# Patient Record
Sex: FEMALE | Race: WHITE | Employment: OTHER | URBAN - METROPOLITAN AREA
[De-identification: names, ages, dates, MRNs, and addresses within clinical notes are randomized per-mention and may not be internally consistent; named-entity substitution may affect disease eponyms.]

---

## 2018-04-02 ENCOUNTER — ANESTHESIA EVENT (OUTPATIENT)
Dept: CARDIOLOGY | Facility: HOSPITAL | Age: 73
End: 2018-04-02
Payer: MEDICARE

## 2018-04-02 ENCOUNTER — HOSPITAL ENCOUNTER (OUTPATIENT)
Dept: CARDIOLOGY | Facility: HOSPITAL | Age: 73
Discharge: HOME | End: 2018-04-02
Attending: INTERNAL MEDICINE | Admitting: INTERNAL MEDICINE
Payer: MEDICARE

## 2018-04-02 ENCOUNTER — DOCUMENTATION (OUTPATIENT)
Dept: CARDIOLOGY | Facility: HOSPITAL | Age: 73
End: 2018-04-02

## 2018-04-02 ENCOUNTER — ANESTHESIA (OUTPATIENT)
Dept: CARDIOLOGY | Facility: HOSPITAL | Age: 73
End: 2018-04-02
Payer: MEDICARE

## 2018-04-02 VITALS
BODY MASS INDEX: 29.99 KG/M2 | RESPIRATION RATE: 21 BRPM | HEART RATE: 66 BPM | HEIGHT: 65 IN | DIASTOLIC BLOOD PRESSURE: 72 MMHG | OXYGEN SATURATION: 93 % | WEIGHT: 180 LBS | SYSTOLIC BLOOD PRESSURE: 143 MMHG

## 2018-04-02 DIAGNOSIS — I48.91 A-FIB (CMS/HCC): ICD-10-CM

## 2018-04-02 PROBLEM — I63.9 CVA (CEREBRAL VASCULAR ACCIDENT) (CMS/HCC): Status: ACTIVE | Noted: 2018-04-02

## 2018-04-02 PROBLEM — Z95.2 S/P AVR (AORTIC VALVE REPLACEMENT): Status: ACTIVE | Noted: 2018-04-02

## 2018-04-02 PROCEDURE — 37000002 HC ANESTHESIA MAC

## 2018-04-02 PROCEDURE — 25800000 HC PHARMACY IV SOLUTIONS: Performed by: ANESTHESIOLOGY

## 2018-04-02 PROCEDURE — 71000011 HC PACU PHASE 1 EA ADDL MIN

## 2018-04-02 PROCEDURE — 25000000 HC PHARMACY GENERAL: Performed by: ANESTHESIOLOGY

## 2018-04-02 PROCEDURE — 71000001 HC PACU PHASE 1 INITIAL 30MIN

## 2018-04-02 PROCEDURE — 63600000 HC DRUGS/DETAIL CODE: Performed by: ANESTHESIOLOGY

## 2018-04-02 PROCEDURE — B246ZZ4 ULTRASONOGRAPHY OF RIGHT AND LEFT HEART, TRANSESOPHAGEAL: ICD-10-PCS | Performed by: INTERNAL MEDICINE

## 2018-04-02 PROCEDURE — 93312 ECHO TRANSESOPHAGEAL: CPT

## 2018-04-02 RX ORDER — LABETALOL HCL 20 MG/4 ML
SYRINGE (ML) INTRAVENOUS AS NEEDED
Status: DISCONTINUED | OUTPATIENT
Start: 2018-04-02 | End: 2018-04-02 | Stop reason: SURG

## 2018-04-02 RX ORDER — CLOPIDOGREL BISULFATE 75 MG/1
75 TABLET ORAL DAILY
COMMUNITY

## 2018-04-02 RX ORDER — LORAZEPAM 1 MG/1
1 TABLET ORAL 2 TIMES DAILY
Status: ON HOLD | COMMUNITY
End: 2021-05-10 | Stop reason: SDUPTHER

## 2018-04-02 RX ORDER — LANOLIN ALCOHOL/MO/W.PET/CERES
1000 CREAM (GRAM) TOPICAL DAILY
Status: ON HOLD | COMMUNITY
End: 2021-05-10 | Stop reason: ALTCHOICE

## 2018-04-02 RX ORDER — OXYCODONE HYDROCHLORIDE 30 MG/1
30 TABLET ORAL EVERY 8 HOURS PRN
Status: ON HOLD | COMMUNITY
End: 2021-05-10 | Stop reason: ALTCHOICE

## 2018-04-02 RX ORDER — GABAPENTIN 300 MG/1
300 CAPSULE ORAL 2 TIMES DAILY
COMMUNITY

## 2018-04-02 RX ORDER — ASPIRIN 81 MG/1
81 TABLET ORAL DAILY
COMMUNITY

## 2018-04-02 RX ORDER — PROPOFOL 200MG/20ML
SYRINGE (ML) INTRAVENOUS AS NEEDED
Status: DISCONTINUED | OUTPATIENT
Start: 2018-04-02 | End: 2018-04-02 | Stop reason: SURG

## 2018-04-02 RX ORDER — LOPERAMIDE HYDROCHLORIDE 2 MG/1
2 CAPSULE ORAL AS NEEDED
Status: ON HOLD | COMMUNITY
End: 2021-05-10 | Stop reason: ALTCHOICE

## 2018-04-02 RX ORDER — OMEPRAZOLE 20 MG/1
20 CAPSULE, DELAYED RELEASE ORAL
Status: ON HOLD | COMMUNITY
End: 2021-05-10 | Stop reason: ALTCHOICE

## 2018-04-02 RX ORDER — GUAIFENESIN 600 MG/1
600 TABLET, EXTENDED RELEASE ORAL DAILY
Status: ON HOLD | COMMUNITY
End: 2021-05-10 | Stop reason: ALTCHOICE

## 2018-04-02 RX ORDER — DICLOFENAC SODIUM 75 MG/1
75 TABLET, DELAYED RELEASE ORAL DAILY
Status: ON HOLD | COMMUNITY
End: 2021-05-10 | Stop reason: ALTCHOICE

## 2018-04-02 RX ORDER — CETIRIZINE HYDROCHLORIDE 10 MG/1
10 TABLET ORAL DAILY
COMMUNITY

## 2018-04-02 RX ORDER — ASPIRIN 325 MG
1000 TABLET, DELAYED RELEASE (ENTERIC COATED) ORAL DAILY
COMMUNITY

## 2018-04-02 RX ORDER — LOSARTAN POTASSIUM 50 MG/1
50 TABLET ORAL DAILY
Status: ON HOLD | COMMUNITY
End: 2021-05-10 | Stop reason: ALTCHOICE

## 2018-04-02 RX ORDER — AMLODIPINE BESYLATE 5 MG/1
5 TABLET ORAL DAILY
COMMUNITY

## 2018-04-02 RX ADMIN — LABETALOL HYDROCHLORIDE 5 MG: 5 INJECTION, SOLUTION INTRAVENOUS at 08:41

## 2018-04-02 RX ADMIN — SODIUM CHLORIDE 100 ML: 9 INJECTION, SOLUTION INTRAVENOUS at 08:49

## 2018-04-02 RX ADMIN — PROPOFOL 200 MG: 10 INJECTION, EMULSION INTRAVENOUS at 08:45

## 2018-04-02 NOTE — Clinical Note
PAULO probe inserted with jaw lift maneuver. Probe inserted without difficulty. Probe insertion attempts: 1. PAULO in progress.

## 2018-04-02 NOTE — ANESTHESIA PREPROCEDURE EVALUATION
Anesthesia ROS/MED HX      Neuro/Psych    CVA (L hemiparesis)  Cardiovascular   ECG reviewed  Abnormal ECG  Musculoskeletal   Osteoarthritis (spinal stenosis, spinal fusion)  ROS/MED HX Comments:    Neurology/Psychology: fibromyalgia   Cardiology: Hx AVR 13 yrs ago   GI/Hepatic/Renal: IBS      Relevant Problems   No active problems are marked relevant to this note.       Physical Exam    Airway   Mallampati: II  Cardiovascular    Rhythm: regular   Rate: normal  Pulmonary - normal   clear to auscultation  Other Findings   Mult caps        Anesthesia Plan    Plan: MAC   ASA 3

## 2018-04-02 NOTE — DISCHARGE INSTRUCTIONS
No driving x 24 hours.    Avoid hot (temperature) and spicy foods for 48 hours.    Continue your usual home medications.

## 2018-04-02 NOTE — POST-PROCEDURE NOTE
IV removed dressing applied. Post discharge instructions explained to pt. & son. Copy given to son.

## 2018-04-02 NOTE — ANESTHESIA POSTPROCEDURE EVALUATION
Patient: Tracy Fernandez    Procedure Summary     Date:  04/02/18 Room / Location:  [facility] Cardiology; [facility] Cardiac Cath/Electrophysiology Holding    Anesthesia Start:  0828 Anesthesia Stop:  0851    Procedure:  TRANSESOPHAGEAL ECHO (PAULO) Diagnosis:  A-fib (CMS/HCC) (HCC)    Scheduled Providers:   Responsible Provider:  Ibrahima Nguyen DO    Anesthesia Type:  MAC ASA Status:  3          Anesthesia Type: MAC  PACU Vitals     No data found.            Anesthesia Post Evaluation    Pain management: adequate  Patient location during evaluation: PACU  Patient participation: complete - patient participated  Level of consciousness: awake and alert  Cardiovascular status: acceptable  Respiratory status: acceptable  Hydration status: acceptable  Anesthetic complications: no

## 2018-04-03 LAB — BSA FOR ECHO PROCEDURE: 1.94 M2

## 2021-05-05 ENCOUNTER — ANESTHESIA EVENT (INPATIENT)
Dept: OPERATING ROOM | Facility: HOSPITAL | Age: 76
DRG: 493 | End: 2021-05-05
Payer: MEDICARE

## 2021-05-05 ENCOUNTER — APPOINTMENT (INPATIENT)
Dept: RADIOLOGY | Facility: HOSPITAL | Age: 76
DRG: 493 | End: 2021-05-05
Payer: MEDICARE

## 2021-05-05 ENCOUNTER — HOSPITAL ENCOUNTER (INPATIENT)
Facility: HOSPITAL | Age: 76
LOS: 5 days | Discharge: NURSING, REHAB OR LONG-TERM CARE | DRG: 493 | End: 2021-05-10
Attending: ORTHOPAEDIC SURGERY | Admitting: ORTHOPAEDIC SURGERY
Payer: MEDICARE

## 2021-05-05 DIAGNOSIS — Z95.2 S/P AVR (AORTIC VALVE REPLACEMENT): Primary | ICD-10-CM

## 2021-05-05 DIAGNOSIS — T84.59XA: ICD-10-CM

## 2021-05-05 DIAGNOSIS — Z96.621: ICD-10-CM

## 2021-05-05 PROBLEM — M06.9 RHEUMATOID ARTHRITIS (CMS/HCC): Status: ACTIVE | Noted: 2021-05-05

## 2021-05-05 PROBLEM — K21.9 GASTROESOPHAGEAL REFLUX DISEASE WITHOUT ESOPHAGITIS: Status: ACTIVE | Noted: 2021-05-05

## 2021-05-05 PROBLEM — I10 ESSENTIAL HYPERTENSION: Status: ACTIVE | Noted: 2021-05-05

## 2021-05-05 LAB
APPEARANCE SNV: ABNORMAL
BODY FLD TYPE: ABNORMAL
COLOR SNV: ABNORMAL
MONOS+MACROS NFR FLD MANUAL: 1 %
NEUTROPHILS NFR FLD MANUAL: 99 %
RBC # SNV: ABNORMAL CELLS/CU MM (ref 0–10000)
SARS-COV-2 RNA RESP QL NAA+PROBE: NEGATIVE
SPECIMEN SOURCE: ABNORMAL
WBC # SNV AUTO: ABNORMAL CELLS/CU MM (ref 0–200)

## 2021-05-05 PROCEDURE — 93005 ELECTROCARDIOGRAM TRACING: CPT | Performed by: NURSE PRACTITIONER

## 2021-05-05 PROCEDURE — 71045 X-RAY EXAM CHEST 1 VIEW: CPT

## 2021-05-05 PROCEDURE — 87070 CULTURE OTHR SPECIMN AEROBIC: CPT | Performed by: STUDENT IN AN ORGANIZED HEALTH CARE EDUCATION/TRAINING PROGRAM

## 2021-05-05 PROCEDURE — 25800000 HC PHARMACY IV SOLUTIONS: Performed by: NURSE PRACTITIONER

## 2021-05-05 PROCEDURE — 12000000 HC ROOM AND CARE MED/SURG

## 2021-05-05 PROCEDURE — 0R9L3ZX DRAINAGE OF RIGHT ELBOW JOINT, PERCUTANEOUS APPROACH, DIAGNOSTIC: ICD-10-PCS | Performed by: ORTHOPAEDIC SURGERY

## 2021-05-05 PROCEDURE — 63700000 HC SELF-ADMINISTRABLE DRUG: Performed by: HOSPITALIST

## 2021-05-05 PROCEDURE — 93005 ELECTROCARDIOGRAM TRACING: CPT | Performed by: ORTHOPAEDIC SURGERY

## 2021-05-05 PROCEDURE — U0003 INFECTIOUS AGENT DETECTION BY NUCLEIC ACID (DNA OR RNA); SEVERE ACUTE RESPIRATORY SYNDROME CORONAVIRUS 2 (SARS-COV-2) (CORONAVIRUS DISEASE [COVID-19]), AMPLIFIED PROBE TECHNIQUE, MAKING USE OF HIGH THROUGHPUT TECHNOLOGIES AS DESCRIBED BY CMS-2020-01-R: HCPCS | Performed by: STUDENT IN AN ORGANIZED HEALTH CARE EDUCATION/TRAINING PROGRAM

## 2021-05-05 PROCEDURE — 63600000 HC DRUGS/DETAIL CODE: Performed by: NURSE PRACTITIONER

## 2021-05-05 PROCEDURE — 99222 1ST HOSP IP/OBS MODERATE 55: CPT | Performed by: HOSPITALIST

## 2021-05-05 PROCEDURE — 73070 X-RAY EXAM OF ELBOW: CPT | Mod: RT

## 2021-05-05 PROCEDURE — 89060 EXAM SYNOVIAL FLUID CRYSTALS: CPT | Performed by: STUDENT IN AN ORGANIZED HEALTH CARE EDUCATION/TRAINING PROGRAM

## 2021-05-05 PROCEDURE — 63700000 HC SELF-ADMINISTRABLE DRUG: Performed by: NURSE PRACTITIONER

## 2021-05-05 PROCEDURE — 25000000 HC PHARMACY GENERAL: Performed by: NURSE PRACTITIONER

## 2021-05-05 PROCEDURE — 89051 BODY FLUID CELL COUNT: CPT | Performed by: STUDENT IN AN ORGANIZED HEALTH CARE EDUCATION/TRAINING PROGRAM

## 2021-05-05 RX ORDER — DIPHENHYDRAMINE HCL 25 MG
25 CAPSULE ORAL EVERY 6 HOURS PRN
Status: DISCONTINUED | OUTPATIENT
Start: 2021-05-05 | End: 2021-05-10 | Stop reason: HOSPADM

## 2021-05-05 RX ORDER — FUROSEMIDE 20 MG/1
20 TABLET ORAL DAILY
Status: DISCONTINUED | OUTPATIENT
Start: 2021-05-06 | End: 2021-05-05

## 2021-05-05 RX ORDER — OXYCODONE HYDROCHLORIDE 5 MG/1
5 TABLET ORAL EVERY 4 HOURS PRN
Status: DISCONTINUED | OUTPATIENT
Start: 2021-05-05 | End: 2021-05-10 | Stop reason: HOSPADM

## 2021-05-05 RX ORDER — ALUMINUM HYDROXIDE, MAGNESIUM HYDROXIDE, AND SIMETHICONE 1200; 120; 1200 MG/30ML; MG/30ML; MG/30ML
30 SUSPENSION ORAL EVERY 4 HOURS PRN
Status: DISCONTINUED | OUTPATIENT
Start: 2021-05-05 | End: 2021-05-10 | Stop reason: HOSPADM

## 2021-05-05 RX ORDER — AMLODIPINE BESYLATE 5 MG/1
5 TABLET ORAL DAILY
Status: DISCONTINUED | OUTPATIENT
Start: 2021-05-06 | End: 2021-05-10 | Stop reason: HOSPADM

## 2021-05-05 RX ORDER — LORAZEPAM 0.5 MG/1
0.5 TABLET ORAL NIGHTLY PRN
Status: DISCONTINUED | OUTPATIENT
Start: 2021-05-05 | End: 2021-05-10 | Stop reason: HOSPADM

## 2021-05-05 RX ORDER — VANCOMYCIN HYDROCHLORIDE
1.25
Status: DISCONTINUED | OUTPATIENT
Start: 2021-05-05 | End: 2021-05-06

## 2021-05-05 RX ORDER — DEXTROSE 40 %
15-30 GEL (GRAM) ORAL AS NEEDED
Status: DISCONTINUED | OUTPATIENT
Start: 2021-05-05 | End: 2021-05-10 | Stop reason: HOSPADM

## 2021-05-05 RX ORDER — AMOXICILLIN 250 MG
1 CAPSULE ORAL 2 TIMES DAILY
Status: DISCONTINUED | OUTPATIENT
Start: 2021-05-05 | End: 2021-05-10 | Stop reason: HOSPADM

## 2021-05-05 RX ORDER — GABAPENTIN 300 MG/1
300 CAPSULE ORAL 2 TIMES DAILY
Status: DISCONTINUED | OUTPATIENT
Start: 2021-05-05 | End: 2021-05-10 | Stop reason: HOSPADM

## 2021-05-05 RX ORDER — DIPHENHYDRAMINE HCL 50 MG/ML
25 VIAL (ML) INJECTION EVERY 6 HOURS PRN
Status: DISCONTINUED | OUTPATIENT
Start: 2021-05-05 | End: 2021-05-10 | Stop reason: HOSPADM

## 2021-05-05 RX ORDER — BACLOFEN 10 MG/1
10 TABLET ORAL 3 TIMES DAILY
Status: DISCONTINUED | OUTPATIENT
Start: 2021-05-05 | End: 2021-05-10 | Stop reason: HOSPADM

## 2021-05-05 RX ORDER — SODIUM CHLORIDE 9 MG/ML
INJECTION, SOLUTION INTRAVENOUS CONTINUOUS
Status: ACTIVE | OUTPATIENT
Start: 2021-05-05 | End: 2021-05-07

## 2021-05-05 RX ORDER — CETIRIZINE HYDROCHLORIDE 10 MG/1
10 TABLET ORAL DAILY
Status: DISCONTINUED | OUTPATIENT
Start: 2021-05-05 | End: 2021-05-10 | Stop reason: HOSPADM

## 2021-05-05 RX ORDER — CHOLECALCIFEROL (VITAMIN D3) 25 MCG
1000 TABLET ORAL DAILY
Status: DISCONTINUED | OUTPATIENT
Start: 2021-05-05 | End: 2021-05-10 | Stop reason: HOSPADM

## 2021-05-05 RX ORDER — ONDANSETRON HYDROCHLORIDE 2 MG/ML
4 INJECTION, SOLUTION INTRAVENOUS EVERY 8 HOURS PRN
Status: DISCONTINUED | OUTPATIENT
Start: 2021-05-05 | End: 2021-05-10 | Stop reason: HOSPADM

## 2021-05-05 RX ORDER — ONDANSETRON 4 MG/1
4 TABLET, ORALLY DISINTEGRATING ORAL EVERY 8 HOURS PRN
Status: DISCONTINUED | OUTPATIENT
Start: 2021-05-05 | End: 2021-05-10 | Stop reason: HOSPADM

## 2021-05-05 RX ORDER — PANTOPRAZOLE SODIUM 20 MG/1
20 TABLET, DELAYED RELEASE ORAL DAILY
Status: DISCONTINUED | OUTPATIENT
Start: 2021-05-06 | End: 2021-05-10 | Stop reason: HOSPADM

## 2021-05-05 RX ORDER — IBUPROFEN 200 MG
16-32 TABLET ORAL AS NEEDED
Status: DISCONTINUED | OUTPATIENT
Start: 2021-05-05 | End: 2021-05-10 | Stop reason: HOSPADM

## 2021-05-05 RX ORDER — HYDROMORPHONE HYDROCHLORIDE 1 MG/ML
0.25 INJECTION, SOLUTION INTRAMUSCULAR; INTRAVENOUS; SUBCUTANEOUS EVERY 4 HOURS PRN
Status: DISCONTINUED | OUTPATIENT
Start: 2021-05-05 | End: 2021-05-10 | Stop reason: HOSPADM

## 2021-05-05 RX ORDER — ATORVASTATIN CALCIUM 40 MG/1
40 TABLET, FILM COATED ORAL
Status: DISCONTINUED | OUTPATIENT
Start: 2021-05-05 | End: 2021-05-10 | Stop reason: HOSPADM

## 2021-05-05 RX ORDER — SENNOSIDES 8.6 MG/1
1 TABLET ORAL 2 TIMES DAILY PRN
Status: DISCONTINUED | OUTPATIENT
Start: 2021-05-05 | End: 2021-05-10 | Stop reason: HOSPADM

## 2021-05-05 RX ORDER — ASPIRIN 81 MG/1
81 TABLET ORAL DAILY
Status: DISCONTINUED | OUTPATIENT
Start: 2021-05-06 | End: 2021-05-05

## 2021-05-05 RX ORDER — METOPROLOL SUCCINATE 25 MG/1
25 TABLET, EXTENDED RELEASE ORAL DAILY
Status: DISCONTINUED | OUTPATIENT
Start: 2021-05-06 | End: 2021-05-10 | Stop reason: HOSPADM

## 2021-05-05 RX ORDER — DEXTROSE 50 % IN WATER (D50W) INTRAVENOUS SYRINGE
25 AS NEEDED
Status: DISCONTINUED | OUTPATIENT
Start: 2021-05-05 | End: 2021-05-10 | Stop reason: HOSPADM

## 2021-05-05 RX ORDER — POLYETHYLENE GLYCOL 3350 17 G/17G
17 POWDER, FOR SOLUTION ORAL DAILY
Status: DISCONTINUED | OUTPATIENT
Start: 2021-05-05 | End: 2021-05-10 | Stop reason: HOSPADM

## 2021-05-05 RX ADMIN — Medication 1000 UNITS: at 19:56

## 2021-05-05 RX ADMIN — VANCOMYCIN HYDROCHLORIDE 1.25 G: 500 INJECTION, POWDER, LYOPHILIZED, FOR SOLUTION INTRAVENOUS at 20:37

## 2021-05-05 RX ADMIN — BACLOFEN 10 MG: 10 TABLET ORAL at 19:56

## 2021-05-05 RX ADMIN — POLYETHYLENE GLYCOL 3350 17 G: 17 POWDER, FOR SOLUTION ORAL at 19:57

## 2021-05-05 RX ADMIN — DOCUSATE SODIUM AND SENNOSIDES 1 TABLET: 8.6; 5 TABLET, FILM COATED ORAL at 19:56

## 2021-05-05 RX ADMIN — GABAPENTIN 300 MG: 300 CAPSULE ORAL at 19:57

## 2021-05-05 RX ADMIN — ATORVASTATIN CALCIUM 40 MG: 40 TABLET, FILM COATED ORAL at 19:56

## 2021-05-05 RX ADMIN — CETIRIZINE HYDROCHLORIDE 10 MG: 10 TABLET, FILM COATED ORAL at 19:56

## 2021-05-05 ASSESSMENT — COGNITIVE AND FUNCTIONAL STATUS - GENERAL
DRESSING REGULAR LOWER BODY CLOTHING: 1 - TOTAL
WALKING IN HOSPITAL ROOM: 1 - TOTAL
MOVING TO AND FROM BED TO CHAIR: 1 - TOTAL
STANDING UP FROM CHAIR USING ARMS: 1 - TOTAL
EATING MEALS: 1 - TOTAL
DRESSING REGULAR UPPER BODY CLOTHING: 1 - TOTAL
HELP NEEDED FOR PERSONAL GROOMING: 1 - TOTAL
TOILETING: 1 - TOTAL
CLIMB 3 TO 5 STEPS WITH RAILING: 1 - TOTAL
HELP NEEDED FOR BATHING: 1 - TOTAL

## 2021-05-05 ASSESSMENT — PATIENT HEALTH QUESTIONNAIRE - PHQ9: SUM OF ALL RESPONSES TO PHQ9 QUESTIONS 1 & 2: 0

## 2021-05-05 NOTE — ASSESSMENT & PLAN NOTE
In 2017 and has left hemiplegia and is bedbound    Continue aspirin and Plavix  Continue baclofen and gabapentin

## 2021-05-05 NOTE — CONSULTS
Consult Note       Subjective     Reason for Consult: Medical management  Consulting Physician:      History of Present Illness: Tracy Fernandez is a 75 y.o. female patient with history of hypertension, right CVA with left hemiplegia in 2017, history of TAVR and 2019, rheumatoid arthritis, GERD came in for right elbow pain and swelling since last 6 days.  Patient denied any trauma to the right elbow.  Patient denied any fever, chills.  Denied any chest pain, palpitations, shortness of breath.  Reports occasional dry cough.  Patient has been bedbound since her stroke in 2017.        Medical History  Past Medical History:   Diagnosis Date   • Hypertension    • Stroke (CMS/[facility])    Gastroesophageal reflux disease  Seasonal allergies  History of aortic valve replacement    Surgical History   Past Surgical History:   Procedure Laterality Date   • AORTIC VALVE REPLACEMENT     • HUMERUS SURGERY Right     replaced   • JOINT REPLACEMENT Bilateral     knee, right hip       Social History  Social History     Socioeconomic History   • Marital status:      Spouse name: Not on file   • Number of children: Not on file   • Years of education: Not on file   • Highest education level: Not on file   Occupational History   • Not on file   Tobacco Use   • Smoking status: Never Smoker   • Smokeless tobacco: Never Used   Substance and Sexual Activity   • Alcohol use: Yes     Comment: occaisional beer   • Drug use: No   • Sexual activity: Not on file   Other Topics Concern   • Not on file   Social History Narrative   • Not on file     Social Determinants of Health     Financial Resource Strain:    • Difficulty of Paying Living Expenses:    Food Insecurity:    • Worried About Running Out of Food in the Last Year:    • Ran Out of Food in the Last Year:    Transportation Needs:    • Lack of Transportation (Medical):    • Lack of Transportation (Non-Medical):    Physical Activity:    • Days of Exercise per Week:    •  Minutes of Exercise per Session:    Stress:    • Feeling of Stress :    Social Connections:    • Frequency of Communication with Friends and Family:    • Frequency of Social Gatherings with Friends and Family:    • Attends Christian Services:    • Active Member of Clubs or Organizations:    • Attends Club or Organization Meetings:    • Marital Status:    Intimate Partner Violence:    • Fear of Current or Ex-Partner:    • Emotionally Abused:    • Physically Abused:    • Sexually Abused:    Patient currently in [facility] skilled nursing facility    Family History  Nothing significant  Allergies  Ambien [zolpidem] and Lisinopril    Home Meds  •  amLODIPine, Take 10 mg by mouth daily.  •  arm brace,   •  aspirin, Take 325 mg by mouth daily.  •  cetirizine, Take 10 mg by mouth daily.  •  cholecalciferol (vitamin D3), Take 1,000 Units by mouth daily.  •  clopidogreL, Take 75 mg by mouth daily.  •  cyanocobalamin, Take 1,000 mcg by mouth daily.  •  diclofenac, Take 75 mg by mouth daily. Two pills twice daily  •  gabapentin, Take 100 mg by mouth nightly. Three tablets  •  guaiFENesin, Take 600 mg by mouth daily.  •  LACTOBACILLUS COMBO NO.6 (PROBIOTIC COMPLEX ORAL), Take by mouth.  •  loperamide, Take 2 mg by mouth as needed for diarrhea.  •  LORazepam, Take 1 mg by mouth 2 (two) times a day.  •  losartan, Take 50 mg by mouth daily.  •  omeprazole, Take 20 mg by mouth daily before breakfast.  •  oxyCODONE, Take 30 mg by mouth every 8 (eight) hours as needed for moderate pain.    Current Meds  •  alum-mag hydroxide-simeth, 30 mL, oral, q4h PRN  •  [START ON 5/6/2021] amLODIPine, 5 mg, oral, Daily  •  atorvastatin, 40 mg, oral, Daily (6p)  •  baclofen, 10 mg, oral, TID  •  cetirizine, 10 mg, oral, Daily  •  cholecalciferol (vitamin D3), 1,000 Units, oral, Daily  •  glucose, 16-32 g of dextrose, oral, PRN **OR** dextrose, 15-30 g of dextrose, oral, PRN **OR** glucagon, 1 mg, intramuscular, PRN **OR** dextrose in water, 25 mL,  intravenous, PRN  •  diphenhydrAMINE, 25 mg, oral, q6h PRN **OR** diphenhydrAMINE, 25 mg, intravenous, q6h PRN  •  [START ON 2021] furosemide, 20 mg, oral, Daily  •  gabapentin, 300 mg, oral, BID  •  oxyCODONE, 5 mg, oral, q4h PRN **AND** HYDROmorphone, 0.25 mg, intravenous, q4h PRN  •  LORazepam, 0.5 mg, oral, Nightly PRN  •  [START ON 2021] metoprolol succinate XL, 25 mg, oral, Daily  •  ondansetron ODT, 4 mg, oral, q8h PRN **OR** ondansetron, 4 mg, intravenous, q8h PRN  •  [START ON 2021] pantoprazole, 20 mg, oral, Daily  •  polyethylene glycol, 17 g, oral, Daily  •  senna, 1 tablet, oral, 2x daily PRN  •  sennosides-docusate sodium, 1 tablet, oral, BID  •  sodium chloride 0.9 %, , intravenous, Continuous  •  vancomycin, 15-20 mg/kg, intravenous, PRN **AND** [] IV Vancomycin Therapy by Pharmacy Protocol, , , Once    Review of Systems  Constitutional: Negative for fatigue and unexpected weight change.   Eyes: Negative for visual disturbance.   Respiratory: Negative for shortness of breath.  Occasional cough present  Cardiovascular: Negative for chest pain and palpitations.   Gastrointestinal: Negative for abdominal pain, constipation, diarrhea, nausea and vomiting.   Genitourinary: Negative for difficulty urinating.   Musculoskeletal: Right elbow pain as mentioned in HPI   Skin: Negative for rash.   Neurological: Negative for dizziness and headaches.   Hematological: Does not bruise/bleed easily.   Psychiatric/Behavioral: Negative for confusion and dysphoric mood  Rest of the review of systems negative  Vital Signs  Temp:  [37.5 °C (99.5 °F)] 37.5 °C (99.5 °F)  Heart Rate:  [76] 76  Resp:  [18] 18  BP: (120)/(58) 120/58     SpO2 Readings from Last 3 Encounters:   21 97%   18 93%       Objective     Physical Exam  Constitutional: She is oriented to person, place, and time. She appears well-developed and well-nourished. No acute distress.   Eyes-pupils equal reacting to light  HEENT:  Normocephalic atraumatic  Neck: No jugular venous distention.  .   Cardiovascular: Normal and regular S1 and S2, regular  Chest: Clear to auscultation bilaterally, no wheezing.  Abdomin: Soft and nontender, bowel sounds are present.  Musculoskeletal: Right elbow cast and dressing present  Neurological: Left hemiplegia present  Skin: No rash  Extremities-no edema  Labs  Lab Results   Component Value Date    WBC 7.74 05/25/2017    HGB 13.3 05/25/2017    HCT 42.2 05/25/2017    MCV 89.8 05/25/2017     05/25/2017     Lab Results   Component Value Date    GLUCOSE 94 05/25/2017    CALCIUM 10.1 05/25/2017     05/25/2017    K 4.2 05/25/2017    CO2 25 05/25/2017     05/25/2017    BUN 19 05/25/2017    CREATININE 0.7 05/25/2017     No results found for: CKTOTAL, CKMB, CKMBINDEX, TROPONINI  Lab Results   Component Value Date    ALBUMIN 4.6 05/25/2017    BILITOT 0.9 05/25/2017    ALKPHOS 85 05/25/2017    AST 16 05/25/2017    ALT 19 05/25/2017    PROTEIN 7.1 05/25/2017       Imaging:  X-RAY ELBOW RIGHT 2 VIEWS    Result Date: 5/5/2021  IMPRESSION: Postsurgical changes.      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  Management as per orthopedics.  S/p aspiration of right elbow swelling  ID consulted for antibiotic    Gastroesophageal reflux disease without esophagitis  Assessment & Plan  Continue Protonix    Rheumatoid arthritis (CMS/[facility])  Assessment & Plan  Monitor    Essential hypertension  Assessment & Plan  Continue metoprolol, amlodipine    S/P AVR (aortic valve replacement)  Assessment & Plan  History of porcine aortic valve replacement in 2005 and TAVR in 2019.    Cardiology eval for preop clearance    CVA (cerebral vascular accident) (CMS/[facility])  Assessment & Plan  In 2017 and has left hemiplegia and is bedbound    Hold aspirin and Plavix and restart after surgery when okay with  orthopedics    Continue baclofen and gabapentin    Discussed with son who is at bedside    Marco A Ortiz MD  5/5/2021  6:25 PM

## 2021-05-05 NOTE — PLAN OF CARE
Problem: Adult Inpatient Plan of Care  Goal: Plan of Care Review  Outcome: Progressing  Goal: Patient-Specific Goal (Individualized)  Outcome: Progressing  Goal: Absence of Hospital-Acquired Illness or Injury  Outcome: Progressing  Goal: Optimal Comfort and Wellbeing  Outcome: Progressing

## 2021-05-05 NOTE — H&P
Orthopedic Consult Note    Subjective     Tracy Fernandez is a 75 y.o. female who presents with pain and swelling in her right elbow since this past Thursday.  Patient denies any abrasions, cuts scrapes or trauma to her elbow, patient states that the pain and swelling began spontaneously, initially noticed by her home aide.  Patient directly admitted to the hospital to Dr. Almanza service for further work-up.  Patient denies any fevers or chills at this time.  Patient denies any numbness or paresthesias in her right hand.  Patient denies any other issues at this time.    Patient does take plavix, last dose 5/5 AM.    Medical History:   Past Medical History:   Diagnosis Date   • Hypertension    • Stroke (CMS/HCC)        Surgical History:   Past Surgical History:   Procedure Laterality Date   • AORTIC VALVE REPLACEMENT     • HUMERUS SURGERY Right     replaced   • JOINT REPLACEMENT Bilateral     knee, right hip       Social History:   Social History     Social History Narrative   • Not on file       Family History: Family history non-contributory.    Allergies: Ambien [zolpidem] and Lisinopril    Current Inpatient Medications   Medication Dose Route Frequency Provider Last Rate Last Admin   • alum-mag hydroxide-simeth (MAALOX) 200-200-20 mg/5 mL suspension 30 mL  30 mL oral q4h PRN Milena Tee, IVANNP       • glucose chewable tablet 16-32 g of dextrose  16-32 g of dextrose oral PRN Milena Tee CRNP        Or   • dextrose 40 % oral gel 15-30 g of dextrose  15-30 g of dextrose oral PRN Milnea Tee, IVANNP        Or   • glucagon (GLUCAGEN) injection 1 mg  1 mg intramuscular PRN Milena Tee CRNP        Or   • dextrose in water injection 12.5 g  25 mL intravenous PRN Milena Tee, IVANNP       • diphenhydrAMINE (BENADRYL) capsule 25 mg  25 mg oral q6h PRN Milena Tee, DIONY        Or   • diphenhydrAMINE (BENADRYL) injection 25 mg  25 mg intravenous q6h PRN Milena Tee, DIONY       • oxyCODONE (ROXICODONE) immediate release  tablet 5 mg  5 mg oral q4h PRN Milena Tee CRNP        And   • HYDROmorphone (DILAUDID) injection 0.25 mg  0.25 mg intravenous q4h PRN Milena Tee CRNP       • ondansetron ODT (ZOFRAN-ODT) disintegrating tablet 4 mg  4 mg oral q8h PRN Milena Tee CRNP        Or   • ondansetron (ZOFRAN) injection 4 mg  4 mg intravenous q8h PRN Milena Tee, IVANNP       • polyethylene glycol (MIRALAX) 17 gram packet 17 g  17 g oral Daily Milena Tee CRNP       • senna (SENOKOT) tablet 1 tablet  1 tablet oral 2x daily PRN Milena Tee CRNP       • sennosides-docusate sodium (SENOKOT-S) 8.6-50 mg per tablet 1 tablet  1 tablet oral BID Milena Tee CRNP       • sodium chloride 0.9 % infusion   intravenous Continuous Milena Tee CRNP       • vancomycin (VANCOCIN) IV therapy by pharmacy protocol  15-20 mg/kg intravenous PRN Milena Tee, DIONY            Medication List      CONTINUE taking these medications    arm brace misc        ASK your doctor about these medications    amLODIPine 10 mg tablet  Commonly known as: NORVASC  Take 10 mg by mouth daily.  Dose: 10 mg     aspirin 325 mg tablet  Take 325 mg by mouth daily.  Dose: 325 mg     cetirizine 10 mg tablet  Commonly known as: ZyrTEC  Take 10 mg by mouth daily.  Dose: 10 mg     cholecalciferol (vitamin D3) 1,000 unit (25 mcg) tablet  Take 1,000 Units by mouth daily.  Dose: 1,000 Units     clopidogreL 75 mg tablet  Commonly known as: PLAVIX  Take 75 mg by mouth daily.  Dose: 75 mg     cyanocobalamin 1,000 mcg tablet  Commonly known as: VITAMIN B12  Take 1,000 mcg by mouth daily.  Dose: 1,000 mcg     diclofenac 75 mg EC tablet  Commonly known as: VOLTAREN  Take 75 mg by mouth daily. Two pills twice daily  Dose: 75 mg     gabapentin 100 mg capsule  Commonly known as: NEURONTIN  Take 100 mg by mouth nightly. Three tablets  Dose: 100 mg     guaiFENesin 600 mg 12 hr tablet  Commonly known as: MUCINEX  Take 600 mg by mouth daily.  Dose: 600 mg     loperamide 2 mg capsule  Commonly known as:  IMODIUM  Take 2 mg by mouth as needed for diarrhea.  Dose: 2 mg     LORazepam 1 mg tablet  Commonly known as: ATIVAN  Take 1 mg by mouth 2 (two) times a day.  Dose: 1 mg     losartan 50 mg tablet  Commonly known as: COZAAR  Take 50 mg by mouth daily.  Dose: 50 mg     omeprazole 20 mg capsule  Commonly known as: PriLOSEC  Take 20 mg by mouth daily before breakfast.  Dose: 20 mg     oxyCODONE 30 mg immediate release tablet  Commonly known as: ROXICODONE  Take 30 mg by mouth every 8 (eight) hours as needed for moderate pain.  Dose: 30 mg     PROBIOTIC COMPLEX ORAL  Take by mouth.          Review of Systems  All other systems reviewed and negative except as noted in the HPI.    Objective     Imaging  X-ray right elbow demonstrates changes consistent w/ TEA and dislocated spacer, large effusion in the elbow    Physical Exam  Gen: awake and alert, no acute distress  HEENT: normocephalic, atraumatic  Cards: regular rate, bcr  Pulm: no increased work of breathing, no audible wheezing    RUE:   Crusting over the olecranon, massive softball sized swelling over the elbow with erythema and fluctuance  Tenderness to palpation over the elbow  Pain with range of motion of the elbow  Sensation intact distally rad ulnar median  Distal motor intact AIN PIN ulnar  +2 radial pulse  Cap refill < 2 secs      Assessment   75 y.o. 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 Almanza service  R elbow aspiration attempted approx 40cc serosang fluid aspirated, sent for cell count, culture, gram stain, crystal  N.p.o. at midnight  Plan for OR with Dr. Almanza tomorrow for I&D  Will need preop medical clearance prior to OR, [facility] consult appreciated  ID consulted, will aspirate elbow and start empiric Vanc, f/u ID recs  Please order preop labs including CBC, BMP, INR/PT, PTT, EKG,  CXR  Ancef on-call to the OR  DVT prophylaxis: hold plavix, SCD's  Type and screen  Weightbearing status: NWB RUE in splint      Discussed with Dr. Almanza, attending on call, who agrees with the plan.    Please page [phone] with questions    Marleny Bravo, DO PGY-2    This note was created using voice-to-text dictation software, please excuse any errors in translation

## 2021-05-05 NOTE — PROGRESS NOTES
Vancomycin Dosing by Pharmacy Consult Initiated    Tracy Fernandez is a 75 y.o. female who has been consulted for vancomycin dosing for bone/joint infection prescribed by Milena Tee .    Reviewed relevant clinical data including weight, renal function, previous vancomycin doses, and vancomycin levels:  No results found for: CREATININE        Vancomycin Administrations (last 96 hours)       None            Assessment/Plan  The patient is ordered vancomycin dosing by pharmacy.    The dose will be based on:         Wt Readings from Last 1 Encounters:   05/05/21 108 kg (237 lb 6.4 oz)         cannot calculate the estimated creatinine clearance    Patient on Vancomycin at [facility] prior to admission.  Last dose of Vancomycin 1000 mg given at [facility] on 5/5/21 at 1300.    Will initiate a maintenance dose of 1250 mg IV every 8 hours.    Target a trough of 15-20 ug/mL per vancomycin dosing per pharmacy order.  Pharmacy will continue to follow the patient's vancomycin dosing daily.    Will recheck Vanco trough on 5/6/21 1130.      Please call vancomycin levels to the pharmacy.  Maria Del Rosario De Los Santos RPh

## 2021-05-05 NOTE — Clinical Note
Patient placed on procedure table in supine position with left arm extended. Positioning devices: all pressure points padded, arm board under arms and safety strap applied.

## 2021-05-05 NOTE — ASSESSMENT & PLAN NOTE
S/p aspiration of right elbow 5/5/2021, culture negative so far  Status post 1.  Excisional irrigation and debridement, right elbow (including skin, subcutaneous fat, fascia, muscle, and bone) 2. Removal of humeral component, right elbow May 7  OR culture: Staph epi  Orthopedic surgery is following    Infectious diseases following  On vancomycin  The plan is for 6 weeks of antibiotics

## 2021-05-05 NOTE — ASSESSMENT & PLAN NOTE
History of porcine aortic valve replacement in 2005 and TAVR in 2018.  Stable    Cardiology eval noted

## 2021-05-06 ENCOUNTER — ANESTHESIA (INPATIENT)
Dept: OPERATING ROOM | Facility: HOSPITAL | Age: 76
DRG: 493 | End: 2021-05-06
Payer: MEDICARE

## 2021-05-06 PROBLEM — E87.6 HYPOKALEMIA: Status: ACTIVE | Noted: 2021-05-06

## 2021-05-06 LAB
ABO + RH BLD: NORMAL
ABO + RH BLD: NORMAL
ANION GAP SERPL CALC-SCNC: 10 MEQ/L (ref 3–15)
ANION GAP SERPL CALC-SCNC: 13 MEQ/L (ref 3–15)
APTT PPP: 32 SEC (ref 23–35)
ATRIAL RATE: 68
ATRIAL RATE: 71
BILIRUB UR QL STRIP.AUTO: NEGATIVE MG/DL
BLD GP AB SCN SERPL QL: NEGATIVE
BUN SERPL-MCNC: 6 MG/DL (ref 8–20)
BUN SERPL-MCNC: 7 MG/DL (ref 8–20)
CALCIUM SERPL-MCNC: 8.1 MG/DL (ref 8.9–10.3)
CALCIUM SERPL-MCNC: 8.2 MG/DL (ref 8.9–10.3)
CHLORIDE SERPL-SCNC: 102 MEQ/L (ref 98–109)
CHLORIDE SERPL-SCNC: 106 MEQ/L (ref 98–109)
CLARITY UR REFRACT.AUTO: CLEAR
CO2 SERPL-SCNC: 20 MEQ/L (ref 22–32)
CO2 SERPL-SCNC: 22 MEQ/L (ref 22–32)
COLOR UR AUTO: YELLOW
CREAT SERPL-MCNC: 0.4 MG/DL (ref 0.6–1.1)
CREAT SERPL-MCNC: 0.4 MG/DL (ref 0.6–1.1)
CRP SERPL-MCNC: 211.3 MG/L
CRYSTALS SNV MICRO: NORMAL
D AG BLD QL: POSITIVE
D AG BLD QL: POSITIVE
DATE+TIME DOSE: ABNORMAL
DATE+TIME DOSE: ABNORMAL
ERYTHROCYTE [DISTWIDTH] IN BLOOD BY AUTOMATED COUNT: 16.2 % (ref 11.7–14.4)
ERYTHROCYTE [DISTWIDTH] IN BLOOD BY AUTOMATED COUNT: 16.3 % (ref 11.7–14.4)
ERYTHROCYTE [SEDIMENTATION RATE] IN BLOOD BY WESTERGREN METHOD: 104 MM/HR
GFR SERPL CREATININE-BSD FRML MDRD: >60 ML/MIN/1.73M*2
GFR SERPL CREATININE-BSD FRML MDRD: >60 ML/MIN/1.73M*2
GLUCOSE SERPL-MCNC: 102 MG/DL (ref 70–99)
GLUCOSE SERPL-MCNC: 94 MG/DL (ref 70–99)
GLUCOSE UR STRIP.AUTO-MCNC: NEGATIVE MG/DL
HCT VFR BLDCO AUTO: 30.5 % (ref 35–45)
HCT VFR BLDCO AUTO: 31.6 % (ref 35–45)
HGB BLD-MCNC: 9.6 G/DL (ref 11.8–15.7)
HGB BLD-MCNC: 9.9 G/DL (ref 11.8–15.7)
HGB UR QL STRIP.AUTO: NEGATIVE
INR PPP: 1.1
KETONES UR STRIP.AUTO-MCNC: 1 MG/DL
LABORATORY COMMENT REPORT: NORMAL
LABORATORY COMMENT REPORT: NORMAL
LEUKOCYTE ESTERASE UR QL STRIP.AUTO: NEGATIVE
MAGNESIUM SERPL-MCNC: 1.8 MG/DL (ref 1.8–2.5)
MCH RBC QN AUTO: 26.2 PG (ref 28–33.2)
MCH RBC QN AUTO: 26.4 PG (ref 28–33.2)
MCHC RBC AUTO-ENTMCNC: 31.3 G/DL (ref 32.2–35.5)
MCHC RBC AUTO-ENTMCNC: 31.5 G/DL (ref 32.2–35.5)
MCV RBC AUTO: 83.1 FL (ref 83–98)
MCV RBC AUTO: 84.3 FL (ref 83–98)
NITRITE UR QL STRIP.AUTO: NEGATIVE
P AXIS: 37
P AXIS: 48
PDW BLD AUTO: 10.4 FL (ref 9.4–12.3)
PDW BLD AUTO: 10.5 FL (ref 9.4–12.3)
PH UR STRIP.AUTO: 6.5 [PH]
PLATELET # BLD AUTO: 322 K/UL (ref 150–369)
PLATELET # BLD AUTO: 330 K/UL (ref 150–369)
POTASSIUM SERPL-SCNC: 3.1 MEQ/L (ref 3.6–5.1)
POTASSIUM SERPL-SCNC: 4 MEQ/L (ref 3.6–5.1)
PR INTERVAL: 142
PR INTERVAL: 144
PROT UR QL STRIP.AUTO: NEGATIVE
PROTHROMBIN TIME: 14.4 SEC (ref 12.2–14.5)
QRS DURATION: 122
QRS DURATION: 124
QT INTERVAL: 432
QT INTERVAL: 434
QTC CALCULATION(BAZETT): 459
QTC CALCULATION(BAZETT): 471
R AXIS: 26
R AXIS: 45
RBC # BLD AUTO: 3.67 M/UL (ref 3.93–5.22)
RBC # BLD AUTO: 3.75 M/UL (ref 3.93–5.22)
SODIUM SERPL-SCNC: 134 MEQ/L (ref 136–144)
SODIUM SERPL-SCNC: 139 MEQ/L (ref 136–144)
SP GR UR REFRACT.AUTO: 1.01
T WAVE AXIS: 89
T WAVE AXIS: 96
UROBILINOGEN UR STRIP-ACNC: 1 EU/DL
VANCOMYCIN TROUGH SERPL-MCNC: 15.1 UG/ML (ref 10–15)
VENTRICULAR RATE: 68
VENTRICULAR RATE: 71
WBC # BLD AUTO: 6.12 K/UL (ref 3.8–10.5)
WBC # BLD AUTO: 6.16 K/UL (ref 3.8–10.5)

## 2021-05-06 PROCEDURE — 85027 COMPLETE CBC AUTOMATED: CPT | Performed by: NURSE PRACTITIONER

## 2021-05-06 PROCEDURE — 86920 COMPATIBILITY TEST SPIN: CPT

## 2021-05-06 PROCEDURE — 85652 RBC SED RATE AUTOMATED: CPT | Performed by: NURSE PRACTITIONER

## 2021-05-06 PROCEDURE — 80202 ASSAY OF VANCOMYCIN: CPT | Performed by: NURSE PRACTITIONER

## 2021-05-06 PROCEDURE — 25800000 HC PHARMACY IV SOLUTIONS: Performed by: NURSE PRACTITIONER

## 2021-05-06 PROCEDURE — 63700000 HC SELF-ADMINISTRABLE DRUG: Performed by: NURSE PRACTITIONER

## 2021-05-06 PROCEDURE — 86901 BLOOD TYPING SEROLOGIC RH(D): CPT

## 2021-05-06 PROCEDURE — 86850 RBC ANTIBODY SCREEN: CPT

## 2021-05-06 PROCEDURE — 83735 ASSAY OF MAGNESIUM: CPT | Performed by: HOSPITALIST

## 2021-05-06 PROCEDURE — 80048 BASIC METABOLIC PNL TOTAL CA: CPT | Performed by: HOSPITALIST

## 2021-05-06 PROCEDURE — 85027 COMPLETE CBC AUTOMATED: CPT | Performed by: HOSPITALIST

## 2021-05-06 PROCEDURE — 99232 SBSQ HOSP IP/OBS MODERATE 35: CPT | Performed by: HOSPITALIST

## 2021-05-06 PROCEDURE — 86140 C-REACTIVE PROTEIN: CPT | Performed by: NURSE PRACTITIONER

## 2021-05-06 PROCEDURE — 85730 THROMBOPLASTIN TIME PARTIAL: CPT | Performed by: NURSE PRACTITIONER

## 2021-05-06 PROCEDURE — 63700000 HC SELF-ADMINISTRABLE DRUG: Performed by: HOSPITALIST

## 2021-05-06 PROCEDURE — 81003 URINALYSIS AUTO W/O SCOPE: CPT | Performed by: NURSE PRACTITIONER

## 2021-05-06 PROCEDURE — 86900 BLOOD TYPING SEROLOGIC ABO: CPT

## 2021-05-06 PROCEDURE — 63600000 HC DRUGS/DETAIL CODE: Performed by: NURSE PRACTITIONER

## 2021-05-06 PROCEDURE — 36415 COLL VENOUS BLD VENIPUNCTURE: CPT | Performed by: NURSE PRACTITIONER

## 2021-05-06 PROCEDURE — 87040 BLOOD CULTURE FOR BACTERIA: CPT | Performed by: NURSE PRACTITIONER

## 2021-05-06 PROCEDURE — 12000000 HC ROOM AND CARE MED/SURG

## 2021-05-06 PROCEDURE — 85610 PROTHROMBIN TIME: CPT | Performed by: NURSE PRACTITIONER

## 2021-05-06 PROCEDURE — 82310 ASSAY OF CALCIUM: CPT | Performed by: NURSE PRACTITIONER

## 2021-05-06 PROCEDURE — 25000000 HC PHARMACY GENERAL: Performed by: NURSE PRACTITIONER

## 2021-05-06 PROCEDURE — 80048 BASIC METABOLIC PNL TOTAL CA: CPT | Performed by: NURSE PRACTITIONER

## 2021-05-06 RX ORDER — VANCOMYCIN HYDROCHLORIDE
1.25
Status: DISCONTINUED | OUTPATIENT
Start: 2021-05-07 | End: 2021-05-10

## 2021-05-06 RX ORDER — ASPIRIN 81 MG/1
81 TABLET ORAL DAILY
Status: DISCONTINUED | OUTPATIENT
Start: 2021-05-06 | End: 2021-05-10 | Stop reason: HOSPADM

## 2021-05-06 RX ORDER — POTASSIUM CHLORIDE 750 MG/1
40 TABLET, FILM COATED, EXTENDED RELEASE ORAL ONCE
Status: COMPLETED | OUTPATIENT
Start: 2021-05-06 | End: 2021-05-06

## 2021-05-06 RX ADMIN — SODIUM CHLORIDE: 9 INJECTION, SOLUTION INTRAVENOUS at 01:25

## 2021-05-06 RX ADMIN — VANCOMYCIN HYDROCHLORIDE 1.25 G: 500 INJECTION, POWDER, LYOPHILIZED, FOR SOLUTION INTRAVENOUS at 12:41

## 2021-05-06 RX ADMIN — METOPROLOL SUCCINATE 25 MG: 25 TABLET, EXTENDED RELEASE ORAL at 08:29

## 2021-05-06 RX ADMIN — OXYCODONE HYDROCHLORIDE 5 MG: 5 TABLET ORAL at 08:30

## 2021-05-06 RX ADMIN — AMLODIPINE BESYLATE 5 MG: 5 TABLET ORAL at 08:29

## 2021-05-06 RX ADMIN — BACLOFEN 10 MG: 10 TABLET ORAL at 13:33

## 2021-05-06 RX ADMIN — ASPIRIN 81 MG: 81 TABLET, DELAYED RELEASE ORAL at 13:33

## 2021-05-06 RX ADMIN — Medication 1000 UNITS: at 08:30

## 2021-05-06 RX ADMIN — SODIUM CHLORIDE: 9 INJECTION, SOLUTION INTRAVENOUS at 21:28

## 2021-05-06 RX ADMIN — LORAZEPAM 0.5 MG: 0.5 TABLET ORAL at 21:25

## 2021-05-06 RX ADMIN — VANCOMYCIN HYDROCHLORIDE 1.25 G: 500 INJECTION, POWDER, LYOPHILIZED, FOR SOLUTION INTRAVENOUS at 01:20

## 2021-05-06 RX ADMIN — BACLOFEN 10 MG: 10 TABLET ORAL at 08:30

## 2021-05-06 RX ADMIN — POTASSIUM CHLORIDE 40 MEQ: 750 TABLET, EXTENDED RELEASE ORAL at 08:29

## 2021-05-06 RX ADMIN — GABAPENTIN 300 MG: 300 CAPSULE ORAL at 08:30

## 2021-05-06 RX ADMIN — CETIRIZINE HYDROCHLORIDE 10 MG: 10 TABLET, FILM COATED ORAL at 08:30

## 2021-05-06 RX ADMIN — PANTOPRAZOLE SODIUM 20 MG: 20 TABLET, DELAYED RELEASE ORAL at 08:30

## 2021-05-06 RX ADMIN — OXYCODONE HYDROCHLORIDE 5 MG: 5 TABLET ORAL at 19:55

## 2021-05-06 RX ADMIN — BACLOFEN 10 MG: 10 TABLET ORAL at 19:54

## 2021-05-06 RX ADMIN — GABAPENTIN 300 MG: 300 CAPSULE ORAL at 19:54

## 2021-05-06 RX ADMIN — ATORVASTATIN CALCIUM 40 MG: 40 TABLET, FILM COATED ORAL at 17:43

## 2021-05-06 NOTE — PLAN OF CARE
Problem: Adult Inpatient Plan of Care  Goal: Plan of Care Review  Outcome: Progressing  Flowsheets (Taken 5/6/2021 3985)  Progress: improving  Plan of Care Reviewed With: patient  Outcome Summary: Pt tolerating new IV.  Straight cath x1 for UA.  Pt remained free from falls this shift and appeared to sleep well overnight. Call bell within reach, bed in low position, and bed alarm set.  Will continue to monitor.   Plan of Care Review  Plan of Care Reviewed With: patient  Progress: improving  Outcome Summary: Pt tolerating new IV.  Straight cath x1 for UA.  Pt remained free from falls this shift and appeared to sleep well overnight. Call bell within reach, bed in low position, and bed alarm set.  Will continue to monitor.

## 2021-05-06 NOTE — PROGRESS NOTES
Hospital Medicine Service  Daily Progress Note       SUBJECTIVE     Patient reports some pain right elbow.  Denied any chest pain or shortness of breath     OBJECTIVE        Vital Signs  Temp:  [36.9 °C (98.4 °F)-37.5 °C (99.5 °F)] 36.9 °C (98.4 °F)  Heart Rate:  [62-76] 62  Resp:  [18] 18  BP: (120-137)/(58-63) 134/63     SpO2 Readings from Last 3 Encounters:   05/05/21 98%   04/02/18 93%       I/O last 3 completed shifts:  In: 980 [P.O.:240; I.V.:240; IV Piggyback:500]  Out: -     PHYSICAL EXAMINATION        GEN:; not in acute distress  HEENT: normocephalic; atraumatic     CARDIO: regular rate and rhythm;   RESP: decreased at bases  ABD: soft,  non-tender, normal bowel sounds  EXT: no  edema  NEURO: alert and oriented x 3; left hemiparesis present       LABS / IMAGING / TELE        Labs  Lab Results   Component Value Date    WBC 6.12 05/06/2021    HGB 9.6 (L) 05/06/2021    HCT 30.5 (L) 05/06/2021    MCV 83.1 05/06/2021     05/06/2021     Lab Results   Component Value Date    GLUCOSE 102 (H) 05/06/2021    CALCIUM 8.1 (L) 05/06/2021     (L) 05/06/2021    K 3.1 (L) 05/06/2021    CO2 22 05/06/2021     05/06/2021    BUN 7 (L) 05/06/2021    CREATININE 0.4 (L) 05/06/2021     Lab Results   Component Value Date    INR 1.1 05/06/2021       Imaging  X-RAY ELBOW RIGHT 2 VIEWS    Result Date: 5/5/2021  IMPRESSION: Postsurgical changes.    X-RAY CHEST 1 VIEW    Result Date: 5/5/2021  IMPRESSION: No acute cardiopulmonary disease.          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  Management as per orthopedics.  S/p aspiration of right elbow swelling 5/5/2021  ID consulted for antibiotic    For I&D and debridement today    Hypokalemia  Assessment & Plan  Replete    Gastroesophageal reflux disease without esophagitis  Assessment & Plan  Continue Protonix    Rheumatoid arthritis (CMS/[facility])  Assessment &  Plan  Monitor    Essential hypertension  Assessment & Plan  Continue metoprolol, amlodipine    S/P AVR (aortic valve replacement)  Assessment & Plan  History of porcine aortic valve replacement in 2005 and TAVR in 2019.    Cardiology eval for preop clearance    CVA (cerebral vascular accident) (CMS/[facility])  Assessment & Plan  In 2017 and has left hemiplegia and is bedbound    Hold aspirin and Plavix and restart after surgery when okay with orthopedics    Continue baclofen and gabapentin           VTE Assessment: scd  Code Status: DNR (A.N.D.)  Estimated discharge date: 5/11/2021     Marco A Ortiz MD  5/6/2021  10:47 AM

## 2021-05-06 NOTE — CONSULTS
Reason for Consult: infected right elbow    History of Present Illness:      Tracy Fernandez is a 75 y.o. female with    Obesity  HTN  AoVR - TAVR 2019 at [facility]  Stroke  B TKA  R JACOB    R elbow surg x 12  First >12 yrs ago  Was at rehab using arm/elbow more often  1wk ago c swelling  Now c bone loss and instability  Said to have bursa erythema suspected of communicating c jt  For OR exploration    No f/c/sweats/emesis    Allergies:   Ambien [zolpidem] and Lisinopril    Current Facility-Administered Medications   Medication Dose Route Frequency Provider Last Rate Last Admin   • alum-mag hydroxide-simeth (MAALOX) 200-200-20 mg/5 mL suspension 30 mL  30 mL oral q4h PRN Milena Tee CRNP       • amLODIPine (NORVASC) tablet 5 mg  5 mg oral Daily Marco A Ortiz MD   5 mg at 05/06/21 0829   • aspirin enteric coated tablet 81 mg  81 mg oral Daily Milena Tee CRNP   81 mg at 05/06/21 1333   • atorvastatin (LIPITOR) tablet 40 mg  40 mg oral Daily (6p) Marco A Ortiz MD   40 mg at 05/06/21 1743   • baclofen (LIORESAL) tablet 10 mg  10 mg oral TID Marco A Ortiz MD   10 mg at 05/06/21 1333   • cetirizine (ZyrTEC) tablet 10 mg  10 mg oral Daily Marco A Ortiz MD   10 mg at 05/06/21 0830   • cholecalciferol (vitamin D3) tablet 1,000 Units  1,000 Units oral Daily Marco A Ortiz MD   1,000 Units at 05/06/21 0830   • glucose chewable tablet 16-32 g of dextrose  16-32 g of dextrose oral PRN Milena Tee CRNP        Or   • dextrose 40 % oral gel 15-30 g of dextrose  15-30 g of dextrose oral PRN Milena Tee CRNP        Or   • glucagon (GLUCAGEN) injection 1 mg  1 mg intramuscular PRN Milena Tee CRNP        Or   • dextrose in water injection 12.5 g  25 mL intravenous PRN Milena Tee CRNP       • diphenhydrAMINE (BENADRYL) capsule 25 mg  25 mg oral q6h PRN Milena Tee CRNP        Or   • diphenhydrAMINE (BENADRYL) injection 25 mg  25 mg intravenous q6h PRN Milena Tee CRNP        • gabapentin (NEURONTIN) capsule 300 mg  300 mg oral BID Marco A Ortiz MD   300 mg at 05/06/21 0830   • oxyCODONE (ROXICODONE) immediate release tablet 5 mg  5 mg oral q4h PRN Milena Tee CRNP   5 mg at 05/06/21 0830    And   • HYDROmorphone (DILAUDID) injection 0.25 mg  0.25 mg intravenous q4h PRN Milena Tee CRNP       • LORazepam (ATIVAN) tablet 0.5 mg  0.5 mg oral Nightly PRN Marco A Ortiz MD       • metoprolol succinate XL (TOPROL-XL) 24 hr ER tablet 25 mg  25 mg oral Daily Marco A Ortiz MD   25 mg at 05/06/21 0829   • ondansetron ODT (ZOFRAN-ODT) disintegrating tablet 4 mg  4 mg oral q8h PRN Milena Tee CRNP        Or   • ondansetron (ZOFRAN) injection 4 mg  4 mg intravenous q8h PRN Milena Tee CRNP       • pantoprazole (PROTONIX) tablet,delayed release (DR/EC) 20 mg  20 mg oral Daily Marco A Ortiz MD   20 mg at 05/06/21 0830   • polyethylene glycol (MIRALAX) 17 gram packet 17 g  17 g oral Daily Milena Tee CRNP   17 g at 05/05/21 1957   • senna (SENOKOT) tablet 1 tablet  1 tablet oral 2x daily PRN Milena eTe CRNP       • sennosides-docusate sodium (SENOKOT-S) 8.6-50 mg per tablet 1 tablet  1 tablet oral BID Milena Tee CRNP   1 tablet at 05/05/21 1956   • sodium chloride 0.9 % infusion   intravenous Continuous Milena Tee CRNP 80 mL/hr at 05/06/21 0125 New Bag at 05/06/21 0125   • [START ON 5/7/2021] vancomycin 1.25 gram/250 mL IVPB in NSS  1.25 g intravenous q12h INT Milena Tee CRNP          Social History:   No tob, occ EtOH    Family History: NC    Review of Systems  Negative x as stated above    Temp:  [36.9 °C (98.4 °F)-37.1 °C (98.8 °F)] 37.1 °C (98.8 °F)  Heart Rate:  [58-62] 58  Resp:  [17-18] 17  BP: (134-151)/(63-67) 151/67  SpO2 Readings from Last 3 Encounters:   05/06/21 97%   04/02/18 93%     Physical Exam  WD Obese NAD    Head: Atraumatic  Skin: No rash  Sclera: Anicteric  Neck: Supple  Lungs: clr  CV: Nl S1 and S2, soft m, no embolic  changes  Abd: Soft, NT, no HSM  Extr: No jt effusions, no edema  Elbow in heavy dressing  Neuro: Alert, lucid    Labs  I have reviewed the patient's pertinent labs.     Imaging:     Indep Rev    X-RAY ELBOW RIGHT 2 VIEWS  Result Date: 5/5/2021  IMPRESSION: Postsurgical changes.    X-RAY CHEST 1 VIEW   Result Date: 5/5/2021  IMPRESSION: No acute cardiopulmonary disease.    Impression    Suspected infected elbow prosthesis  For OR  No prior cx seen  Agree c vanco alone for now    MANJULA Knowles MD  Pager [phone]

## 2021-05-06 NOTE — PROGRESS NOTES
74y/o patient transferred in from [facility] yesterday.  The patient has a complex history of previous infection following total elbow arthroplasty.  She now has essentially a dysfunctional instability of the elbow with significant proximal ulnar bone loss.  The patient was planned for a custom reconstruction of her ulna when she sustained a devastating stroke and has been unable to have that surgery and more importantly is not a candidate for that surgery anymore given the significant upper extremity requirements of weightbearing on that affected side.  The patient uses that arm to post off of in bed and developed some erythema at the region of the olecranon bursa which likely communicates with her joint.  The joint was tapped last night and it appears that the cell count is indicative of an infectious process.      AAOx3, NAD.  VSS.  Patient has an enlarged erythematous olecranon bursa which clearly communicates with her joint.  She has profound dysfunctional instability of the elbow as a result of the elbow being partially resected.  Motor and sensory exam in the hand and fingers are normal.    Imagin views of the elbow are available for review.  The patient is noted to have proximal ulnar and proximal radius bone loss.  There are 2 cement spacers in the region of her olecranon bursa and about the distal humeral component.  No acute fractures are noted.    Plan:   1.  Irrigation and debridement with removal of spacer and removal of her humeral component.  2.  DVT prophylaxis

## 2021-05-06 NOTE — CONSULTS
Cardiology Consult Note         REASON FOR CONSULT:    CONSULT FROM: Tyler Almanza MD    HPI:  Tracy Fernandez is a 75 y.o. female who was admitted with infected elbow, for possible surgical management.  The patient is known to Dr. Santana who last saw her February 2021 in the office.  We are asked to comment on cardiovascular clearance for possible surgery.    -Hypertension  -Right CVA with left hemiplegia 2017  -TAVR 2019  -GERD  -Rheumatoid arthritis    The patient is nonambulatory following prior stroke and paralysis of left upper and lower extremity.  She denies any concerning cardiac review of systems.  She does not escalate stairs.      PAST MEDICAL & SURGICAL HISTORY  Past Medical History:   Diagnosis Date   • Hypertension    • Stroke (CMS/HCC)      Past Surgical History:   Procedure Laterality Date   • AORTIC VALVE REPLACEMENT     • HUMERUS SURGERY Right     replaced   • JOINT REPLACEMENT Bilateral     knee, right hip       MEDICATIONS:    Home medications  •  amLODIPine, Take 10 mg by mouth daily.  •  aspirin, Take 325 mg by mouth daily.  •  cetirizine, Take 10 mg by mouth daily.  •  cholecalciferol (vitamin D3), Take 1,000 Units by mouth daily.  •  cyanocobalamin, Take 1,000 mcg by mouth daily.  •  diclofenac, Take 75 mg by mouth daily. Two pills twice daily  •  guaiFENesin, Take 600 mg by mouth daily.  •  LORazepam, Take 1 mg by mouth 2 (two) times a day.  •  losartan, Take 50 mg by mouth daily.  •  omeprazole, Take 20 mg by mouth daily before breakfast.  •  oxyCODONE, Take 30 mg by mouth every 8 (eight) hours as needed for moderate pain.  •  arm brace,   •  clopidogreL, Take 75 mg by mouth daily.  •  gabapentin, Take 100 mg by mouth nightly. Three tablets  •  LACTOBACILLUS COMBO NO.6 (PROBIOTIC COMPLEX ORAL), Take by mouth.  •  loperamide, Take 2 mg by mouth as needed for diarrhea.    In hospital medications  • amLODIPine  5 mg oral Daily   • atorvastatin  40 mg oral Daily (6p)   • baclofen   10 mg oral TID   • cetirizine  10 mg oral Daily   • cholecalciferol (vitamin D3)  1,000 Units oral Daily   • gabapentin  300 mg oral BID   • metoprolol succinate XL  25 mg oral Daily   • pantoprazole  20 mg oral Daily   • polyethylene glycol  17 g oral Daily   • sennosides-docusate sodium  1 tablet oral BID   • vancomycin  1.25 g intravenous q8h INT     • sodium chloride 0.9 %   80 mL/hr at 05/06/21 0125       ALLERGIES:  Ambien [zolpidem] and Lisinopril    SOCIAL HISTORY:  No smoking. No alcohol    FAMILY HISTORY:  No premature CAD    REVIEW OF SYSTEMS:  Constitutional: denies weight gain and weight loss  HEENT: denies blurred vision and irritation sore throat and hoarseness  Respiratory: denies dyspnea on exertion and chest pain/tightness  Cardiovascular: denies chest pain, dyspnea, orthopnea, PND, edema, palpitations, syncope  Gastrointestinal: denies nausea, vomiting and diarrhea  Genitourinary: denies painful urination and frequency  Integument: denies itching and dryness  Hematologic/lymphatic:  denies easy bruising and petechiae  Musculoskeletal: denies myalgias, no further right elbow pain  Neurological: denies vertigo and tremors  Behavioral/Psych: denies anxiety and depression  Endocrine: denies cold intolerance and heat intolerance    PHYSICAL EXAM:  Temp:  [36.9 °C (98.4 °F)-37.5 °C (99.5 °F)] 36.9 °C (98.4 °F)  Heart Rate:  [62-76] 62  Resp:  [18] 18  BP: (120-137)/(58-63) 137/63, pulse ox 98% room air    Intake/Output Summary (Last 24 hours) at 5/6/2021 0626  Last data filed at 5/5/2021 2037  Gross per 24 hour   Intake 250 ml   Output --   Net 250 ml       Constitutional: Appears stated age. No apparent distress.  Overweight  General appearance: Awake, alert, cooperative  HEENT: PERRLA, EOM's intact. Neck supple. No JVD noted.   Lungs: Clear to auscultation bilaterally, no rales or wheezing.  Heart: S1, S2 normal, very soft systolic murmur, no click, rub or gallop, regular rate and rhythm, no JVD  noted.  Abdomen: Soft, non-tender; bowel sounds normal; no masses appreciated. No rebound or guarding.  Lymph: No adenopathy noted.  Extremities: Peripheral pulses intact x 4.  No lower extremity edema noted.  Skin: Skin color, texture, turgor normal. No rashes or lesions  Neurologic: Left upper and lower extremity paralysis, diminished sensory in those distributions  Psych: Flat affect. Alert and oriented X 3.        LABS:   Results from last 7 days   Lab Units 05/06/21  0051   SODIUM mEQ/L 134*   POTASSIUM mEQ/L 3.1*   CHLORIDE mEQ/L 102   CO2 mEQ/L 22   BUN mg/dL 7*   CREATININE mg/dL 0.4*     Results from last 7 days   Lab Units 05/06/21  0051   WBC K/uL 6.12   HEMOGLOBIN g/dL 9.6*   HEMATOCRIT % 30.5*   PLATELETS K/uL 322   INR  1.1     No results found for: HGBA1C, TSH  No results found for: CHOL, LDLCALC, LDLDIRECT, HDL, TRIG        IMAGING:  Postsurgical changes on right elbow x-rays.    ECG:   Tracing 5/5/2021-sinus rhythm 68, unifocal PVC, nonspecific ST/T wave change.    TELEMETRY:  Not currently.        ASSESSMENT AND PLAN:    1) preoperative cardiac clearance-acceptable cardiac risk if surgical intervention required to manage infected right elbow.  EKG unremarkable.  Exam unremarkable.  No concerning cardiac review of systems.  She clearly needs intervention as dictated by orthopedic team.  The patient chronically is on dual antiplatelet therapy.  Would ask that aspirin be restarted as soon as possible and ideally, the addition of Plavix when safe from a postoperative bleeding standpoint.  There is clearly no risk-free scenario in this context.    2) hypertension-blood pressure controlled at this time.    3) prior TAVR-soft systolic murmur, follow-up echo will be done in our office.  Patient does not consistently follow-up with [facility] but prefers to follow-up with Dr. Santana whom she saw in February.    4) prior CVA-residual left-sided upper and lower extremity paralysis.  Secondary  prophylaxis ongoing including dual antiplatelet therapy as above.  Of note, our records suggest aspirin 81 mg daily along with Plavix 75 mg daily.  Hospital records suggest aspirin 325 mg daily.    As of tomorrow 5/7/2021-the patient will be followed with Dr. Santana who knows her from the office.    Yang Ramirez MD  5/6/2021  Primary Care Doctor: #[id number], [facility]

## 2021-05-06 NOTE — PLAN OF CARE
Problem: Adult Inpatient Plan of Care  Goal: Readiness for Transition of Care  Intervention: Mutually Develop Transition Plan  Flowsheets (Taken 5/6/2021 1156)  Anticipated Discharge Disposition: skilled nursing facility  Equipment Needed After Discharge: (RIGHT ARM IMMOBILIZER) --  Assistive Device/Animal Currently Used at Home: (MOTORIZED WHEELCHAIR) --  Anticipated Changes Related to Illness: (BLS, bed / wheelchair bound) --  Current Discharge Risk:  • chronically ill  • dependent with mobility/activities of daily living  • physical impairment  Patient/Family Anticipated Services at Transition:  •   • skilled nursing  Patient/Family Anticipates Transition to: long-term care facility  Concerns to be Addressed:  • home safety  • discharge planning  • adjustment to diagnosis/illness  • basic needs  • coping/stress  • financial/insurance  Offered/Gave Vendor List: yes   CM performed a chart review prior to meeting the patient who verified all information and discussed discharge planning.  The patient is being worked up for a complex right elbow infection.  The patient is RIGHT HAND DOMINANT.  She has a LEFT HEMIPARESIS from a 2017 CVA, left facial droop.  She is still able to use her left arm but with limited range of motion.  She is able to be transferred to her motorized wheelchair otherwise she has not been able to  her extremities well.  She resides at [facility] for 3 1/2 years [phone].  The unit manager where she resides is DARI BRADLEY.  Her son, Moe [phone] is her only son and he is not  but has a girlfriend, Georgette Hoffman and her 17 and 16 year old daughters.  The patient will have her right elbow debrided and will await culture results.  PLAN:  TO BE DETERMINED.   John Amaya RN

## 2021-05-06 NOTE — NURSING NOTE
Attempted X 2 to place a PIV and blood draw with U/S on left arm unsuccessful, patient's nurse is aware.

## 2021-05-06 NOTE — NURSING NOTE
Nursing supervisor and RN from ED (Judah) attempted IV insertion.  Judah inserted ultrasound guided IV and norm labs.  Pt now started IV vancomycin followed by normal saline fluids.

## 2021-05-07 ENCOUNTER — APPOINTMENT (INPATIENT)
Dept: RADIOLOGY | Facility: HOSPITAL | Age: 76
DRG: 493 | End: 2021-05-07
Attending: STUDENT IN AN ORGANIZED HEALTH CARE EDUCATION/TRAINING PROGRAM
Payer: MEDICARE

## 2021-05-07 PROCEDURE — 99232 SBSQ HOSP IP/OBS MODERATE 35: CPT | Performed by: HOSPITALIST

## 2021-05-07 PROCEDURE — 63700000 HC SELF-ADMINISTRABLE DRUG: Performed by: NURSE PRACTITIONER

## 2021-05-07 PROCEDURE — 0RPL0JZ REMOVAL OF SYNTHETIC SUBSTITUTE FROM RIGHT ELBOW JOINT, OPEN APPROACH: ICD-10-PCS | Performed by: ORTHOPAEDIC SURGERY

## 2021-05-07 PROCEDURE — 63700000 HC SELF-ADMINISTRABLE DRUG: Performed by: HOSPITALIST

## 2021-05-07 PROCEDURE — 87186 SC STD MICRODIL/AGAR DIL: CPT | Performed by: ORTHOPAEDIC SURGERY

## 2021-05-07 PROCEDURE — 25000000 HC PHARMACY GENERAL: Performed by: NURSE ANESTHETIST, CERTIFIED REGISTERED

## 2021-05-07 PROCEDURE — 12000000 HC ROOM AND CARE MED/SURG

## 2021-05-07 PROCEDURE — 87102 FUNGUS ISOLATION CULTURE: CPT | Performed by: ORTHOPAEDIC SURGERY

## 2021-05-07 PROCEDURE — 71000011 HC PACU PHASE 1 EA ADDL MIN: Performed by: ORTHOPAEDIC SURGERY

## 2021-05-07 PROCEDURE — 87206 SMEAR FLUORESCENT/ACID STAI: CPT | Performed by: ORTHOPAEDIC SURGERY

## 2021-05-07 PROCEDURE — 36000015 HC OR LEVEL 5 EA ADDL MIN: Performed by: ORTHOPAEDIC SURGERY

## 2021-05-07 PROCEDURE — 73070 X-RAY EXAM OF ELBOW: CPT | Mod: RT

## 2021-05-07 PROCEDURE — 25000000 HC PHARMACY GENERAL: Performed by: NURSE PRACTITIONER

## 2021-05-07 PROCEDURE — 63600000 HC DRUGS/DETAIL CODE: Performed by: SPECIALIST

## 2021-05-07 PROCEDURE — 36000005 HC OR LEVEL 5 INITIAL 30MIN: Performed by: ORTHOPAEDIC SURGERY

## 2021-05-07 PROCEDURE — 63600000 HC DRUGS/DETAIL CODE: Performed by: NURSE PRACTITIONER

## 2021-05-07 PROCEDURE — 0PBC0ZZ EXCISION OF RIGHT HUMERAL HEAD, OPEN APPROACH: ICD-10-PCS | Performed by: ORTHOPAEDIC SURGERY

## 2021-05-07 PROCEDURE — 27200000 HC STERILE SUPPLY: Performed by: ORTHOPAEDIC SURGERY

## 2021-05-07 PROCEDURE — 63600000 HC DRUGS/DETAIL CODE: Performed by: NURSE ANESTHETIST, CERTIFIED REGISTERED

## 2021-05-07 PROCEDURE — 25800000 HC PHARMACY IV SOLUTIONS: Performed by: NURSE PRACTITIONER

## 2021-05-07 PROCEDURE — 37000001 HC ANESTHESIA GENERAL: Performed by: ORTHOPAEDIC SURGERY

## 2021-05-07 PROCEDURE — 25000000 HC PHARMACY GENERAL: Performed by: ORTHOPAEDIC SURGERY

## 2021-05-07 PROCEDURE — 71000001 HC PACU PHASE 1 INITIAL 30MIN: Performed by: ORTHOPAEDIC SURGERY

## 2021-05-07 PROCEDURE — 87015 SPECIMEN INFECT AGNT CONCNTJ: CPT | Performed by: ORTHOPAEDIC SURGERY

## 2021-05-07 PROCEDURE — 87070 CULTURE OTHR SPECIMN AEROBIC: CPT | Performed by: ORTHOPAEDIC SURGERY

## 2021-05-07 RX ORDER — GLYCOPYRROLATE 0.6MG/3ML
SYRINGE (ML) INTRAVENOUS AS NEEDED
Status: DISCONTINUED | OUTPATIENT
Start: 2021-05-07 | End: 2021-05-07 | Stop reason: SURG

## 2021-05-07 RX ORDER — DEXAMETHASONE SODIUM PHOSPHATE 4 MG/ML
INJECTION, SOLUTION INTRA-ARTICULAR; INTRALESIONAL; INTRAMUSCULAR; INTRAVENOUS; SOFT TISSUE AS NEEDED
Status: DISCONTINUED | OUTPATIENT
Start: 2021-05-07 | End: 2021-05-07 | Stop reason: SURG

## 2021-05-07 RX ORDER — ROCURONIUM BROMIDE 10 MG/ML
INJECTION, SOLUTION INTRAVENOUS AS NEEDED
Status: DISCONTINUED | OUTPATIENT
Start: 2021-05-07 | End: 2021-05-07 | Stop reason: SURG

## 2021-05-07 RX ORDER — MIDAZOLAM HYDROCHLORIDE 2 MG/2ML
INJECTION, SOLUTION INTRAMUSCULAR; INTRAVENOUS AS NEEDED
Status: DISCONTINUED | OUTPATIENT
Start: 2021-05-07 | End: 2021-05-07 | Stop reason: SURG

## 2021-05-07 RX ORDER — NEOSTIGMINE METHYLSULFATE 1 MG/ML
INJECTION INTRAVENOUS AS NEEDED
Status: DISCONTINUED | OUTPATIENT
Start: 2021-05-07 | End: 2021-05-07 | Stop reason: SURG

## 2021-05-07 RX ORDER — PROPOFOL 10 MG/ML
INJECTION, EMULSION INTRAVENOUS AS NEEDED
Status: DISCONTINUED | OUTPATIENT
Start: 2021-05-07 | End: 2021-05-07 | Stop reason: SURG

## 2021-05-07 RX ORDER — MEPERIDINE HYDROCHLORIDE 25 MG/ML
12.5 INJECTION INTRAMUSCULAR; INTRAVENOUS; SUBCUTANEOUS EVERY 10 MIN PRN
Status: DISCONTINUED | OUTPATIENT
Start: 2021-05-07 | End: 2021-05-07 | Stop reason: HOSPADM

## 2021-05-07 RX ORDER — DIPHENHYDRAMINE HCL 50 MG/ML
12.5 VIAL (ML) INJECTION
Status: DISCONTINUED | OUTPATIENT
Start: 2021-05-07 | End: 2021-05-07 | Stop reason: HOSPADM

## 2021-05-07 RX ORDER — ONDANSETRON HYDROCHLORIDE 2 MG/ML
INJECTION, SOLUTION INTRAVENOUS AS NEEDED
Status: DISCONTINUED | OUTPATIENT
Start: 2021-05-07 | End: 2021-05-07 | Stop reason: SURG

## 2021-05-07 RX ORDER — CLOPIDOGREL BISULFATE 75 MG/1
75 TABLET ORAL DAILY
Status: DISCONTINUED | OUTPATIENT
Start: 2021-05-08 | End: 2021-05-10 | Stop reason: HOSPADM

## 2021-05-07 RX ORDER — HYDROMORPHONE HYDROCHLORIDE 1 MG/ML
INJECTION, SOLUTION INTRAMUSCULAR; INTRAVENOUS; SUBCUTANEOUS AS NEEDED
Status: DISCONTINUED | OUTPATIENT
Start: 2021-05-07 | End: 2021-05-07 | Stop reason: SURG

## 2021-05-07 RX ORDER — LIDOCAINE HYDROCHLORIDE 10 MG/ML
INJECTION, SOLUTION INFILTRATION; PERINEURAL AS NEEDED
Status: DISCONTINUED | OUTPATIENT
Start: 2021-05-07 | End: 2021-05-07 | Stop reason: SURG

## 2021-05-07 RX ORDER — HYDROMORPHONE HYDROCHLORIDE 1 MG/ML
0.5 INJECTION, SOLUTION INTRAMUSCULAR; INTRAVENOUS; SUBCUTANEOUS
Status: DISCONTINUED | OUTPATIENT
Start: 2021-05-07 | End: 2021-05-07 | Stop reason: HOSPADM

## 2021-05-07 RX ORDER — FENTANYL CITRATE 50 UG/ML
INJECTION, SOLUTION INTRAMUSCULAR; INTRAVENOUS AS NEEDED
Status: DISCONTINUED | OUTPATIENT
Start: 2021-05-07 | End: 2021-05-07 | Stop reason: SURG

## 2021-05-07 RX ORDER — EPHEDRINE SULFATE/0.9% NACL/PF 50 MG/5 ML
SYRINGE (ML) INTRAVENOUS AS NEEDED
Status: DISCONTINUED | OUTPATIENT
Start: 2021-05-07 | End: 2021-05-07 | Stop reason: SURG

## 2021-05-07 RX ORDER — CEFAZOLIN SODIUM/WATER 2 G/20 ML
2 SYRINGE (ML) INTRAVENOUS
Status: DISCONTINUED | OUTPATIENT
Start: 2021-05-07 | End: 2021-05-07 | Stop reason: HOSPADM

## 2021-05-07 RX ORDER — ONDANSETRON HYDROCHLORIDE 2 MG/ML
4 INJECTION, SOLUTION INTRAVENOUS
Status: DISCONTINUED | OUTPATIENT
Start: 2021-05-07 | End: 2021-05-07 | Stop reason: HOSPADM

## 2021-05-07 RX ORDER — SODIUM CHLORIDE 0.9 G/100ML
INJECTION, SOLUTION IRRIGATION AS NEEDED
Status: DISCONTINUED | OUTPATIENT
Start: 2021-05-07 | End: 2021-05-07 | Stop reason: HOSPADM

## 2021-05-07 RX ORDER — FENTANYL CITRATE 50 UG/ML
50 INJECTION, SOLUTION INTRAMUSCULAR; INTRAVENOUS
Status: DISCONTINUED | OUTPATIENT
Start: 2021-05-07 | End: 2021-05-07 | Stop reason: HOSPADM

## 2021-05-07 RX ADMIN — OXYCODONE HYDROCHLORIDE 5 MG: 5 TABLET ORAL at 17:17

## 2021-05-07 RX ADMIN — FENTANYL CITRATE 50 MCG: 50 INJECTION INTRAMUSCULAR; INTRAVENOUS at 12:00

## 2021-05-07 RX ADMIN — FENTANYL CITRATE 100 MCG: 50 INJECTION INTRAMUSCULAR; INTRAVENOUS at 10:21

## 2021-05-07 RX ADMIN — LORAZEPAM 0.5 MG: 0.5 TABLET ORAL at 19:49

## 2021-05-07 RX ADMIN — VANCOMYCIN HYDROCHLORIDE 1.25 G: 500 INJECTION, POWDER, LYOPHILIZED, FOR SOLUTION INTRAVENOUS at 01:31

## 2021-05-07 RX ADMIN — OXYCODONE HYDROCHLORIDE 5 MG: 5 TABLET ORAL at 23:05

## 2021-05-07 RX ADMIN — MIDAZOLAM HYDROCHLORIDE 2 MG: 1 INJECTION, SOLUTION INTRAMUSCULAR; INTRAVENOUS at 10:15

## 2021-05-07 RX ADMIN — VANCOMYCIN HYDROCHLORIDE 1.25 G: 500 INJECTION, POWDER, LYOPHILIZED, FOR SOLUTION INTRAVENOUS at 14:41

## 2021-05-07 RX ADMIN — GLYCOPYRROLATE 0.6 MG: 0.2 INJECTION, SOLUTION INTRAMUSCULAR; INTRAVITREAL at 11:37

## 2021-05-07 RX ADMIN — CEFAZOLIN 2 G: 10 INJECTION, POWDER, FOR SOLUTION INTRAVENOUS at 19:49

## 2021-05-07 RX ADMIN — LIDOCAINE HYDROCHLORIDE 10 ML: 10 INJECTION, SOLUTION INFILTRATION; PERINEURAL at 10:21

## 2021-05-07 RX ADMIN — HYDROMORPHONE HYDROCHLORIDE 0.5 MCG: 1 INJECTION, SOLUTION INTRAMUSCULAR; INTRAVENOUS; SUBCUTANEOUS at 10:49

## 2021-05-07 RX ADMIN — FENTANYL CITRATE 50 MCG: 50 INJECTION INTRAMUSCULAR; INTRAVENOUS at 11:47

## 2021-05-07 RX ADMIN — DEXAMETHASONE SODIUM PHOSPHATE 4 MG: 4 INJECTION, SOLUTION INTRA-ARTICULAR; INTRALESIONAL; INTRAMUSCULAR; INTRAVENOUS; SOFT TISSUE at 10:21

## 2021-05-07 RX ADMIN — BACLOFEN 10 MG: 10 TABLET ORAL at 19:49

## 2021-05-07 RX ADMIN — CEFAZOLIN 2 G: 330 INJECTION, POWDER, FOR SOLUTION INTRAMUSCULAR; INTRAVENOUS at 10:21

## 2021-05-07 RX ADMIN — FENTANYL CITRATE 50 MCG: 50 INJECTION, SOLUTION INTRAMUSCULAR; INTRAVENOUS at 12:52

## 2021-05-07 RX ADMIN — ATORVASTATIN CALCIUM 40 MG: 40 TABLET, FILM COATED ORAL at 17:17

## 2021-05-07 RX ADMIN — PROPOFOL 200 MG: 10 INJECTION, EMULSION INTRAVENOUS at 10:21

## 2021-05-07 RX ADMIN — ONDANSETRON 4 MG: 2 INJECTION INTRAMUSCULAR; INTRAVENOUS at 10:21

## 2021-05-07 RX ADMIN — NEOSTIGMINE METHYLSULFATE 3 MG: 1 INJECTION INTRAVENOUS at 11:37

## 2021-05-07 RX ADMIN — HYDROMORPHONE HYDROCHLORIDE 0.5 MCG: 1 INJECTION, SOLUTION INTRAMUSCULAR; INTRAVENOUS; SUBCUTANEOUS at 11:07

## 2021-05-07 RX ADMIN — FENTANYL CITRATE 50 MCG: 50 INJECTION, SOLUTION INTRAMUSCULAR; INTRAVENOUS at 13:25

## 2021-05-07 RX ADMIN — BACLOFEN 10 MG: 10 TABLET ORAL at 14:46

## 2021-05-07 RX ADMIN — AMLODIPINE BESYLATE 5 MG: 5 TABLET ORAL at 08:12

## 2021-05-07 RX ADMIN — METOPROLOL SUCCINATE 25 MG: 25 TABLET, EXTENDED RELEASE ORAL at 08:12

## 2021-05-07 RX ADMIN — Medication 10 MG: at 10:42

## 2021-05-07 RX ADMIN — ROCURONIUM BROMIDE 50 MG: 10 INJECTION INTRAVENOUS at 10:21

## 2021-05-07 RX ADMIN — GABAPENTIN 300 MG: 300 CAPSULE ORAL at 19:49

## 2021-05-07 NOTE — PLAN OF CARE
Problem: Adult Inpatient Plan of Care  Goal: Plan of Care Review  Outcome: Progressing  Flowsheets (Taken 5/7/2021 0219)  Progress: improving  Plan of Care Reviewed With: patient  Outcome Summary: VSS. reports moderate pain in right elbow.  tolerating diet.  turned and repositioned.  NPO p mn for OR today.  resting throughout the night.  Goal: Patient-Specific Goal (Individualized)  Outcome: Progressing  Goal: Absence of Hospital-Acquired Illness or Injury  Outcome: Progressing  Goal: Optimal Comfort and Wellbeing  Outcome: Progressing  Goal: Readiness for Transition of Care  Outcome: Progressing     Problem: Fall Injury Risk  Goal: Absence of Fall and Fall-Related Injury  Outcome: Progressing

## 2021-05-07 NOTE — PROGRESS NOTES
Hospital Medicine Service  Daily Progress Note       SUBJECTIVE   Patient seen earlier today  Patient reports some pain right elbow.  Denied any chest pain or shortness of breath.  Sleeping, arousable     OBJECTIVE        Vital Signs  Temp:  [36.8 °C (98.3 °F)-37.7 °C (99.8 °F)] 36.8 °C (98.3 °F)  Heart Rate:  [58-70] 61  Resp:  [16-18] 16  BP: (116-161)/(65-85) 138/66     SpO2 Readings from Last 3 Encounters:   05/07/21 94%   04/02/18 93%       I/O last 3 completed shifts:  In: 1940 [P.O.:240; I.V.:1200; IV Piggyback:500]  Out: -     PHYSICAL EXAMINATION        GEN:; not in acute distress  HEENT: normocephalic; atraumatic     CARDIO: regular rate and rhythm; systolic murmur present  RESP: decreased at bases  ABD: soft,  non-tender, normal bowel sounds  EXT: Right elbow cast with dressing present.  No pedal edema  NEURO: Sleeping, arousable; left hemiparesis present       LABS / IMAGING / TELE        Labs  Lab Results   Component Value Date    WBC 6.16 05/06/2021    HGB 9.9 (L) 05/06/2021    HCT 31.6 (L) 05/06/2021    MCV 84.3 05/06/2021     05/06/2021     Lab Results   Component Value Date    GLUCOSE 94 05/06/2021    CALCIUM 8.2 (L) 05/06/2021     05/06/2021    K 4.0 05/06/2021    CO2 20 (L) 05/06/2021     05/06/2021    BUN 6 (L) 05/06/2021    CREATININE 0.4 (L) 05/06/2021     Lab Results   Component Value Date    INR 1.1 05/06/2021       Imaging  X-RAY ELBOW RIGHT 2 VIEWS    Result Date: 5/5/2021  IMPRESSION: Postsurgical changes.    X-RAY CHEST 1 VIEW    Result Date: 5/5/2021  IMPRESSION: No acute cardiopulmonary disease.          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  Management as per orthopedics.  S/p aspiration of right elbow swelling 5/5/2021  ID consulted for antibiotic    For I&D and debridement today    Hypokalemia  Assessment & Plan  Replete    Gastroesophageal reflux disease without  esophagitis  Assessment & Plan  Continue Protonix    Rheumatoid arthritis (CMS/[facility])  Assessment & Plan  Monitor    Essential hypertension  Assessment & Plan  Continue metoprolol, amlodipine    S/P AVR (aortic valve replacement)  Assessment & Plan  History of porcine aortic valve replacement in 2005 and TAVR in 2019.    Cardiology eval noted    CVA (cerebral vascular accident) (CMS/[facility])  Assessment & Plan  In 2017 and has left hemiplegia and is bedbound    Hold Plavix and restart after surgery when okay with orthopedics  Continue aspirin  Continue baclofen and gabapentin       For OR today  VTE Assessment: scd  Code Status: DNR (A.N.D.)  Estimated discharge date: 5/11/2021     Marco A Ortiz MD  5/7/2021  10:01 AM

## 2021-05-07 NOTE — OR SURGEON
Pre-Procedure patient identification:  I am the primary operating surgeon/proceduralist and I have identified the patient and confirmed laterality is right on 05/07/21 at 9:46 AM Tyler Almanza MD  Phone Number: [phone]

## 2021-05-07 NOTE — ANESTHESIA PROCEDURE NOTES
Airway  Urgency: elective    Start Time: 5/7/2021 10:23 AM  Airway not difficult    General Information and Staff    Patient location during procedure: OR  Anesthesiologist: Eddie Jones MD  Resident/CRNA: Alonzo Montana CRNA  Performed: resident/CRNA     Indications and Patient Condition  Indications for airway management: anesthesia  Sedation level: deep  Preoxygenated: yes  Patient position: sniffing  MILS maintained throughout  Mask difficulty assessment: 1 - vent by mask    Final Airway Details  Final airway type: endotracheal airway      Successful airway: ETT    Successful intubation technique: flexible bronchoscopy  Endotracheal tube insertion site: oral  ETT size (mm): 7.0  Placement verified by: chest auscultation and capnometry   Measured from: lips  ETT to lips (cm): 22  Number of attempts at approach: 1  Number of other approaches attempted: 0  Atraumatic airway insertion

## 2021-05-07 NOTE — ANESTHESIA POSTPROCEDURE EVALUATION
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) [T84.59XA, Z96.621])    Surgeons: Tyler Almanza MD Responsible Provider: Eddie Jones MD    Anesthesia Type: general ASA Status: 3          Anesthesia Type: general  PACU Vitals  5/7/2021 1200 - 5/7/2021 1217      5/7/2021  1205             BP:  (!) 124/56    Temp:  36.3 °C (97.4 °F)    Pulse:  64    Resp:  (!) 10    SpO2:  92 %            Anesthesia Post Evaluation    Pain management: adequate  Patient location during evaluation: PACU  Patient participation: complete - patient participated  Level of consciousness: awake and alert  Cardiovascular status: acceptable  Airway Patency: adequate  Respiratory status: acceptable  Hydration status: acceptable  Anesthetic complications: no

## 2021-05-07 NOTE — OP NOTE
PREOPERATIVE DIAGNOSIS:   1. Prosthetic joint infection, right elbow.    POSTOPERATIVE DIAGNOSES:  1.  Prosthetic joint infection, right elbow.  2. Painful retained hardware, right elbow     OPERATIONS PERFORMED:  1.  Excisional irrigation and debridement, right elbow (including skin, subcutaneous fat, fascia, muscle, and bone)  2. Removal of humeral component, right elbow     ASSISTANT:  Sudhakar Poon DO     ANESTHESIA:  General endotracheal     ESTIMATED BLOOD LOSS:  Minimal.     DRAINS:  None    SPECIMENS:  Fluid and tissue for culture     COMPLICATIONS:  None     INDICATIONS FOR SURGERY:  The patient is a 75-year-old patient with a history of pain and functional limitations referable to the right elbow.  The patient has a failed total elbow arthroplasty status post previous resection of her ulnar component. The patient has a flail extremity at this point and has developed redness and swelling along the posterior aspect of the elbow indicative of possible infection. She has an aspirate where the cell count indicates infection. Given the nature of her underlying problem she is indicated for excisional irrigation and debridement with removal of the humeral component.     DESCRIPTION OF PROCEDURE:  As the attending physician for this patient, I can attest to the fact that I was present for all critical portions of this operation from skin incision to wound closure.  The patient was brought to the operating room and transferred to an OR table. After a regional and general anesthetic obtained, the patient's right upper extremity was prepped in the standard orthopedic fashion.  Time-out was performed.  After time-out, surgery was commenced.      The limb was elevated and the tourniquet was inflated to 250 mmHg.  The patient's previous posterior skin incision just o was utilized and was taken sharply down through skin and subcutaneous tissues.  Subcutaneous flaps were elevated.  The ulnar nerve was identified high in  the arm on the medial side.  The patient was noted to have a triceps deficient elbow. A midline split of the triceps in line with previous sutures placed for prior surgery was performed. This was carried distally into the interval between the anconeus and the flexor carpi ulnaris. Upon getting through the deep tissue gross purulence was encountered. This fluid was retrieved and sent for culture. The patient had a well-defined pocket around the distal humerus where infection was encountered. Distal to this the wound was extremely clean. A mechanical debridement was performed and a portion of the skin, subcutaneous fat, fascia, muscle and bone were excised in order to debride away any infected devitalized tissue. After initial mechanical debridement 3 L of pulsatile lavage were brought through the wound. A second mechanical debridement was performed and ultimately a second 3 L bag was used.    With mechanical excisional debridement completed wound closure was performed. The deep fascial tissues were closed with buried interrupted 0 Prolene sutures. Subcutaneous tissues were closed with a 2-0 Monocryl and skin staples used for skin. A Mariia wound VAC system was applied to the elbow. A posterior splint was applied with the elbow at 90 degrees of flexion. The patient was extubated transferred to [facility] taken to the postanesthesia care unit in stable condition. All sponge needle instrument counts reported correct x2 prior to leaving the OR.     A

## 2021-05-07 NOTE — ANESTHESIOLOGIST PRE-PROCEDURE ATTESTATION
Pre-Procedure Patient Identification:  I am the Primary Anesthesiologist and have identified the patient on 05/07/21 at 9:46 AM.   I have confirmed the following procedure(s) INCISION & DRAINAGE RIGHT ELBOW, POSSIBLE VAC (R), RIGHT ELBOW RESECTION ARTHROPLASTY (R) will be performed by the following surgeon/proceduralist Tyler Almanza MD.

## 2021-05-07 NOTE — BRIEF OP NOTE
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     Estimated Blood Loss: No blood loss documented.    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) [T84.59XA, Z96.621] Collected By: Tyler Almanza MD 5/7/2021 10:56 AM     Release to patient   Immediate        FUNGAL CULTURE / SMEAR Elbow, Right Pre-op diagnosis:  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) [T84.59XA, Z96.621] Collected By: Tyler Almanza MD 5/7/2021 10:56 AM     Release to patient   Immediate        TISSUE CULTURE / SMEAR Elbow, Right Pre-op diagnosis:  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) [T84.59XA, Z96.621] Collected By: Tyler Almanza MD  5/7/2021 10:56 AM     Release to patient   Immediate        AFB CULTURE / SMEAR Elbow, Right Pre-op diagnosis:  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           Disposition: PACU - hemodynamically stable.           Condition: stable    Tyler Almanza MD  Phone Number: [phone]

## 2021-05-07 NOTE — PROGRESS NOTES
Ortho Daily Progress Note    Subjective     Interval History: Patient seen and examined at bedside. No acute events overnight. Patient states that she continues to have some pain in the right elbow. Patient is aware she is going to the OR today, has been n.p.o. since midnight      Objective     Vital signs in last 24 hours:  Temp:  [37.1 °C (98.8 °F)-37.7 °C (99.8 °F)] 37.7 °C (99.8 °F)  Heart Rate:  [58-70] 65  Resp:  [16-18] 18  BP: (116-161)/(63-85) 116/69      Labs:  Results from last 7 days   Lab Units 05/06/21  1048 05/06/21 0051   WBC K/uL 6.16 6.12   HEMOGLOBIN g/dL 9.9* 9.6*   HEMATOCRIT % 31.6* 30.5*   PLATELETS K/uL 330 322     Results from last 7 days   Lab Units 05/06/21 0051   INR  1.1   PTT sec 32       Microbiology Results     Procedure Component Value Units Date/Time    Blood Culture Blood, Venous [[id number]] Collected: 05/06/21 0051    Specimen: Blood, Venous Updated: 05/07/21 0500     Culture No growth at 18-24 hours    Blood Culture Blood, Venous [[id number]] Collected: 05/06/21 0051    Specimen: Blood, Venous Updated: 05/07/21 0500     Culture No growth at 18-24 hours    SARS-CoV-2 (COVID-19), PCR Nasopharynx [[id number]]  (Normal) Collected: 05/05/21 1835    Specimen: Nasopharyngeal Swab from Nasopharynx Updated: 05/05/21 1936    Narrative:      The following orders were created for panel order SARS-CoV-2 (COVID-19), PCR Nasopharynx.  Procedure                               Abnormality         Status                     ---------                               -----------         ------                     SARS-CoV-2 (COVID-19), P...[[id number]]  Normal              Final result                 Please view results for these tests on the individual orders.    SARS-CoV-2 (COVID-19), PCR Nasopharynx [[id number]]  (Normal) Collected: 05/05/21 1835    Specimen: Nasopharyngeal Swab from Nasopharynx Updated: 05/05/21 1936     SARS-CoV-2 (COVID-19) Negative    Anaerobic Culture / Smear (includes Aerobic  Culture) Elbow, Right [[id number]] Collected: 05/05/21 6067    Specimen: Abscess Fluid from Elbow, Right Updated: 05/06/21 3779     Culture No growth at 18-24 hours     Gram Stain Result 4+ WBC      No organisms seen            Imaging:  X-ray right elbow demonstrates total arthroplasty with cement spacer dislodged and olecranon bursa      Physical Exam:  Gen: awake and alert, no acute distress  HEENT: normocephalic, atraumatic  Cards: regular rate, bcr  Pulm: no increased work of breathing, no audible wheezing    RUE:   Splint in place  Crusting over the olecranon, massive softball sized swelling over the elbow with erythema and fluctuance  Tenderness to palpation over the elbow  Pain with range of motion of the elbow  Sensation intact distally rad ulnar median  Distal motor intact AIN PIN ulnar  +2 radial pulse  Cap refill < 2 secs    A/P:   75 y.o. female status post right JACOB 2009 with subsequent patient had several revision surgeries, most recently in antibiotic spacer, now presenting with new onset atraumatic pain and swelling of the right elbow.     Plan for irrigation debridement with resection arthroplasty today 5/7 with Dr. Almanza.    Cleared by cardiology preoperatively, appreciate recommendations  ID consulted, empiric Vanc, f/u ID recs  Right elbow aspiration 5/5: 55,000 cells, 99% PMNs, no crystals  Follow-up elbow culture results, no growth to date  Preop labs including CBC, BMP, INR/PT, PTT, EKG, CXR  Ancef on-call to the OR  DVT prophylaxis: hold plavix, SCD's  Type and screen  Weightbearing status: NWB RUE in splint       Please page [phone] with questions    Marleny Bravo, DO PGY-2    This note was created using voice-to-text dictation software, please excuse any errors in translation

## 2021-05-07 NOTE — PROGRESS NOTES
CARDIOLOGY PROGRESS NOTE      Subjective     No events overnight. Patient denies cardiac complaints. Tolerating all medications without side effects. For OR today.       Objective     Vital signs in last 24 hours  Temp:  [37.1 °C (98.8 °F)-37.7 °C (99.8 °F)] 37.7 °C (99.8 °F)  Heart Rate:  [58-70] 65  Resp:  [16-18] 18  BP: (116-161)/(63-85) 116/69      Intake/Output Summary (Last 24 hours) at 5/7/2021 0808  Last data filed at 5/7/2021 0500  Gross per 24 hour   Intake 960 ml   Output --   Net 960 ml       Admission weight: Weight: 108 kg (237 lb 6.4 oz)    Today's weight: [unfilled]      Physical Exam  General appearance: alert, appears stated age and cooperative  Head: without obvious abnormality  Eyes: PERRLA, EOM's intact  Lungs: clear to auscultation bilaterally, no rales or wheezing  Heart: S1, S2 normal, no murmur, click, rub or gallop, regular rate and rhythm, no JVD  Abdomen: soft, non-tender; bowel sounds normal; no masses  Extremities: peripheral pulses intact, no edema  Skin: Skin color, texture, turgor normal. No rashes or lesions  Neurologic: Alert and oriented X 3, no focal deficits    Labs  Lab Results   Component Value Date    GLUCOSE 94 05/06/2021    CALCIUM 8.2 (L) 05/06/2021     05/06/2021    K 4.0 05/06/2021    CO2 20 (L) 05/06/2021     05/06/2021    BUN 6 (L) 05/06/2021    CREATININE 0.4 (L) 05/06/2021    MG 1.8 05/06/2021     No results found for: CKTOTAL, CKMB, CKMBINDEX, TROPONINI  Lab Results   Component Value Date    WBC 6.16 05/06/2021    HGB 9.9 (L) 05/06/2021    HCT 31.6 (L) 05/06/2021    MCV 84.3 05/06/2021     05/06/2021    INR 1.1 05/06/2021         Lab Results   Component Value Date    BNP 91 07/16/2015       Current Meds  • amLODIPine  5 mg oral Daily   • aspirin  81 mg oral Daily   • atorvastatin  40 mg oral Daily (6p)   • baclofen  10 mg oral TID   • cetirizine  10 mg oral Daily   • cholecalciferol (vitamin D3)  1,000 Units oral Daily   • gabapentin  300  mg oral BID   • metoprolol succinate XL  25 mg oral Daily   • pantoprazole  20 mg oral Daily   • polyethylene glycol  17 g oral Daily   • sennosides-docusate sodium  1 tablet oral BID   • vancomycin  1.25 g intravenous q12h INT       Imaging  No new    ECG   No new    Telemetry  Not on telemetry      Assessment & Plan    1. Preoperative clearance: For OR today with Dr. Almanza for irrigation debridement with resection arthroplasty.  Restart aspirin as soon as possible ideally, the addition of Plavix when safe from a postoperative bleeding standpoint.     2. Hypertension: Controlled.     3. Prior TAVR: Soft systolic murmur. Follow up echo will be arranged in our office.     4. 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 Secondary prophylaxis ongoing.  Of note, our records suggest aspirin 81 mg daily along with Plavix 75 mg daily.  Hospital records suggest aspirin 325 mg daily.

## 2021-05-07 NOTE — ANESTHESIA PREPROCEDURE EVALUATION
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 female who was admitted with infected elbow, for possible surgical management.  The patient is known to Dr. Santana who last saw her February 2021 in the office.  We are asked to comment on cardiovascular clearance for possible surgery.     -Hypertension  -Right CVA with left hemiplegia 2017  -TAVR 2019  -GERD  -Rheumatoid arthritis     The patient is nonambulatory following prior stroke and paralysis of left upper and lower extremity.  She denies any concerning cardiac review of systems.  She does not escalate stairs.  .....  ASSESSMENT AND PLAN:     1) preoperative cardiac clearance-acceptable cardiac risk if surgical intervention required to manage infected right elbow.  EKG unremarkable.  Exam unremarkable.  No concerning cardiac review of systems.  She clearly needs intervention as dictated by orthopedic team.  The patient chronically is on dual antiplatelet therapy.  Would ask that aspirin be restarted as soon as possible and ideally, the addition of Plavix when safe from a postoperative bleeding standpoint.  There is clearly no risk-free scenario in this context.     2) hypertension-blood pressure controlled at this time.     3) prior TAVR-soft systolic murmur, follow-up echo will be done in our office.  Patient does not consistently follow-up with [facility] but prefers to follow-up with Dr. Santana whom she saw in February.     4) prior CVA-residual left-sided upper and lower extremity paralysis.  Secondary prophylaxis ongoing including dual antiplatelet  therapy as above.  Of note, our records suggest aspirin 81 mg daily along with Plavix 75 mg daily.  Hospital records suggest aspirin 325 mg daily.  ... Gunjan Ramirez MD  5/6/2021    Anesthesia ROS/MED HX      Neuro/Psych    CVA   neuropathy  Cardiovascular   hypertension   CHF   ECG reviewed  Comments: Mult caps  GI/Hepatic   GERD  Musculoskeletal   Arthritis  Pediatric Considerations    Murmur  ROS/MED HX Comments:    ECG: Normal sinus rhythm   Nonspecific intraventricular conduction delay   Nonspecific ST and T wave abnormality   Abnormal ECG   No significant change noted from prior   Confirmed by GUNJAN RAMIREZ ([id number]) on 5/6/2021 9:10:58 AM    Specimen Collected: 05/05/21 16:55  Last Resulted: 05/06/21 09:10           Past Medical History:   Diagnosis Date   • Hypertension    • Stroke (CMS/HCC)      Past Surgical History:   Procedure Laterality Date   • AORTIC VALVE REPLACEMENT     • HUMERUS SURGERY Right     replaced   • JOINT REPLACEMENT Bilateral     knee, right hip     Patient Active Problem List   Diagnosis   • CVA (cerebral vascular accident) (CMS/HCC)   • S/P AVR (aortic valve replacement)   • 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)   • Essential hypertension   • Rheumatoid arthritis (CMS/HCC)   • Gastroesophageal reflux disease without esophagitis   • Hypokalemia       Current Facility-Administered Medications   Medication Dose Route Frequency   • alum-mag hydroxide-simeth  30 mL oral q4h PRN   • amLODIPine  5 mg oral Daily   • aspirin  81 mg oral Daily   • atorvastatin  40 mg oral Daily (6p)   • baclofen  10 mg oral TID   • cetirizine  10 mg oral Daily   • cholecalciferol (vitamin D3)  1,000 Units oral Daily   • glucose  16-32 g of dextrose oral PRN    Or   • dextrose  15-30 g of dextrose oral PRN    Or   • glucagon  1 mg intramuscular PRN    Or   • dextrose in water  25 mL intravenous PRN   • diphenhydrAMINE  25 mg oral q6h PRN    Or   • diphenhydrAMINE  25 mg intravenous  q6h PRN   • gabapentin  300 mg oral BID   • oxyCODONE  5 mg oral q4h PRN    And   • HYDROmorphone  0.25 mg intravenous q4h PRN   • LORazepam  0.5 mg oral Nightly PRN   • metoprolol succinate XL  25 mg oral Daily   • ondansetron ODT  4 mg oral q8h PRN    Or   • ondansetron  4 mg intravenous q8h PRN   • pantoprazole  20 mg oral Daily   • polyethylene glycol  17 g oral Daily   • senna  1 tablet oral 2x daily PRN   • sennosides-docusate sodium  1 tablet oral BID   • vancomycin  1.25 g intravenous q12h INT       Prior to Admission medications    Medication Sig Start Date End Date Taking? Authorizing Provider   amLODIPine (NORVASC) 10 mg tablet Take 10 mg by mouth daily.   Yes Abimbola Silva MD   aspirin 325 mg tablet Take 325 mg by mouth daily.   Yes ProviderAbimbola MD   cetirizine (ZyrTEC) 10 mg tablet Take 10 mg by mouth daily.   Yes ProviderAbimbola MD   cholecalciferol, vitamin D3, 1,000 unit tablet Take 1,000 Units by mouth daily.   Yes ProviderAbimbola MD   cyanocobalamin (VITAMIN B12) 1,000 mcg tablet Take 1,000 mcg by mouth daily.   Yes ProviderAbimbola MD   diclofenac (VOLTAREN) 75 mg EC tablet Take 75 mg by mouth daily. Two pills twice daily   Yes ProviderAbimbola MD   guaiFENesin (MUCINEX) 600 mg 12 hr tablet Take 600 mg by mouth daily.   Yes ProviderAbimbola MD   LORazepam (ATIVAN) 1 mg tablet Take 1 mg by mouth 2 (two) times a day.   Yes ProviderAbimbola MD   losartan (COZAAR) 50 mg tablet Take 50 mg by mouth daily.   Yes ProviderAbimbola MD   omeprazole (PriLOSEC) 20 mg capsule Take 20 mg by mouth daily before breakfast.   Yes ProviderAbimbola MD   oxyCODONE (ROXICODONE) 30 mg immediate release tablet Take 30 mg by mouth every 8 (eight) hours as needed for moderate pain.   Yes Abimbola Silva MD   arm brace misc     ProviderAbimbola MD   clopidogrel (PLAVIX) 75 mg tablet Take 75 mg by mouth daily.    ProviderAbimbola MD   gabapentin  (NEURONTIN) 100 mg capsule Take 100 mg by mouth nightly. Three tablets    Abimbola Silva MD   LACTOBACILLUS COMBO NO.6 (PROBIOTIC COMPLEX ORAL) Take by mouth.    Abimbola Silva MD   loperamide (IMODIUM) 2 mg capsule Take 2 mg by mouth as needed for diarrhea.    Abimbola Silva MD       CBC Results       05/06/21 05/06/21 05/25/17                    1048 0051 1239         WBC 6.16 6.12 7.74         RBC 3.75 3.67 4.70         HGB 9.9 9.6 13.3         HCT 31.6 30.5 42.2         MCV 84.3 83.1 89.8         MCH 26.4 26.2 28.3         MCHC 31.3 31.5 31.5          322 320                       BMP Results       05/06/21 05/06/21 05/25/17                    1048 0051 1240          134 139         K 4.0 3.1 4.2         Cl 106 102 105         CO2 20 22 25         Glucose 94 102 94         BUN 6 7 19         Creatinine 0.4 0.4 0.7         Calcium 8.2 8.1 10.1         Anion Gap 13 10 9         EGFR >60.0 >60.0 --                       No results found for: HCGPREGUR, PREGSERUM, HCG, HCGQUANT    Results from last 7 days   Lab Units 05/06/21  0051   INR  1.1   PTT sec 32       X-RAY CHEST 1 VIEW   Final Result   IMPRESSION: No acute cardiopulmonary disease.      X-RAY ELBOW RIGHT 2 VIEWS   Final Result   IMPRESSION:      Postsurgical changes.      ECG 12 lead   Final Result      ECG 12 lead   Final Result        Physical Exam    Airway   Mallampati: III   Neck ROM: limited  Cardiovascular    MurmurPulmonary    Decreased breath sounds  Other Findings   Mult caps  Dental    Teeth Problems: caps        Anesthesia Plan    Plan: general    Technique: general endotracheal     Airway: direct visual laryngoscopy, oral intubation and video laryngoscope     Discussed plan with: attending  ASA 3  Anesthetic plan and risks discussed with: patient  Induction:    intravenous   Postop Plan:   Patient Disposition: inpatient floor planned admission

## 2021-05-08 PROBLEM — D64.9 ANEMIA: Status: ACTIVE | Noted: 2021-05-08

## 2021-05-08 PROBLEM — I69.30 HISTORY OF CVA WITH RESIDUAL DEFICIT: Status: ACTIVE | Noted: 2018-04-02

## 2021-05-08 LAB
ALBUMIN SERPL-MCNC: 2.7 G/DL (ref 3.4–5)
ALP SERPL-CCNC: 492 IU/L (ref 35–126)
ALT SERPL-CCNC: 84 IU/L (ref 11–54)
ANION GAP SERPL CALC-SCNC: 10 MEQ/L (ref 3–15)
AST SERPL-CCNC: 48 IU/L (ref 15–41)
BILIRUB DIRECT SERPL-MCNC: 0.1 MG/DL
BILIRUB SERPL-MCNC: 0.8 MG/DL (ref 0.3–1.2)
BUN SERPL-MCNC: 9 MG/DL (ref 8–20)
CALCIUM SERPL-MCNC: 8.6 MG/DL (ref 8.9–10.3)
CHLORIDE SERPL-SCNC: 107 MEQ/L (ref 98–109)
CO2 SERPL-SCNC: 24 MEQ/L (ref 22–32)
CREAT SERPL-MCNC: 0.5 MG/DL (ref 0.6–1.1)
DATE+TIME DOSE: 1441
DATE+TIME DOSE: ABNORMAL
ERYTHROCYTE [DISTWIDTH] IN BLOOD BY AUTOMATED COUNT: 16.4 % (ref 11.7–14.4)
FUNGUS STAIN: NORMAL
GFR SERPL CREATININE-BSD FRML MDRD: >60 ML/MIN/1.73M*2
GLUCOSE SERPL-MCNC: 142 MG/DL (ref 70–99)
HCT VFR BLDCO AUTO: 32.3 % (ref 35–45)
HGB BLD-MCNC: 10.1 G/DL (ref 11.8–15.7)
MAGNESIUM SERPL-MCNC: 1.9 MG/DL (ref 1.8–2.5)
MCH RBC QN AUTO: 26.4 PG (ref 28–33.2)
MCHC RBC AUTO-ENTMCNC: 31.3 G/DL (ref 32.2–35.5)
MCV RBC AUTO: 84.3 FL (ref 83–98)
PDW BLD AUTO: 10.1 FL (ref 9.4–12.3)
PLATELET # BLD AUTO: 420 K/UL (ref 150–369)
POTASSIUM SERPL-SCNC: 3.8 MEQ/L (ref 3.6–5.1)
PROT SERPL-MCNC: 5.9 G/DL (ref 6–8.2)
RBC # BLD AUTO: 3.83 M/UL (ref 3.93–5.22)
RHODAMINE-AURAMINE STN SPEC: NORMAL
SODIUM SERPL-SCNC: 141 MEQ/L (ref 136–144)
VANCOMYCIN TROUGH SERPL-MCNC: 17.2 UG/ML (ref 10–15)
WBC # BLD AUTO: 8.03 K/UL (ref 3.8–10.5)

## 2021-05-08 PROCEDURE — 12000000 HC ROOM AND CARE MED/SURG

## 2021-05-08 PROCEDURE — 80076 HEPATIC FUNCTION PANEL: CPT | Performed by: HOSPITALIST

## 2021-05-08 PROCEDURE — 80048 BASIC METABOLIC PNL TOTAL CA: CPT | Performed by: HOSPITALIST

## 2021-05-08 PROCEDURE — 63700000 HC SELF-ADMINISTRABLE DRUG: Performed by: NURSE PRACTITIONER

## 2021-05-08 PROCEDURE — 36415 COLL VENOUS BLD VENIPUNCTURE: CPT | Performed by: NURSE PRACTITIONER

## 2021-05-08 PROCEDURE — 97162 PT EVAL MOD COMPLEX 30 MIN: CPT | Mod: GP | Performed by: PHYSICAL THERAPIST

## 2021-05-08 PROCEDURE — 97166 OT EVAL MOD COMPLEX 45 MIN: CPT | Mod: GO

## 2021-05-08 PROCEDURE — 85027 COMPLETE CBC AUTOMATED: CPT | Performed by: HOSPITALIST

## 2021-05-08 PROCEDURE — 25000000 HC PHARMACY GENERAL: Performed by: NURSE PRACTITIONER

## 2021-05-08 PROCEDURE — 25800000 HC PHARMACY IV SOLUTIONS: Performed by: NURSE PRACTITIONER

## 2021-05-08 PROCEDURE — 63600000 HC DRUGS/DETAIL CODE: Performed by: NURSE PRACTITIONER

## 2021-05-08 PROCEDURE — 83735 ASSAY OF MAGNESIUM: CPT | Performed by: HOSPITALIST

## 2021-05-08 PROCEDURE — 99232 SBSQ HOSP IP/OBS MODERATE 35: CPT | Performed by: HOSPITALIST

## 2021-05-08 PROCEDURE — 80202 ASSAY OF VANCOMYCIN: CPT | Performed by: NURSE PRACTITIONER

## 2021-05-08 RX ORDER — CLOPIDOGREL BISULFATE 75 MG/1
75 TABLET ORAL DAILY
Status: DISCONTINUED | OUTPATIENT
Start: 2021-05-08 | End: 2021-05-08

## 2021-05-08 RX ADMIN — CEFAZOLIN 2 G: 10 INJECTION, POWDER, FOR SOLUTION INTRAVENOUS at 02:12

## 2021-05-08 RX ADMIN — ASPIRIN 81 MG: 81 TABLET, DELAYED RELEASE ORAL at 09:26

## 2021-05-08 RX ADMIN — BACLOFEN 10 MG: 10 TABLET ORAL at 14:13

## 2021-05-08 RX ADMIN — VANCOMYCIN HYDROCHLORIDE 1.25 G: 500 INJECTION, POWDER, LYOPHILIZED, FOR SOLUTION INTRAVENOUS at 14:22

## 2021-05-08 RX ADMIN — CETIRIZINE HYDROCHLORIDE 10 MG: 10 TABLET, FILM COATED ORAL at 09:23

## 2021-05-08 RX ADMIN — OXYCODONE HYDROCHLORIDE 5 MG: 5 TABLET ORAL at 09:26

## 2021-05-08 RX ADMIN — BACLOFEN 10 MG: 10 TABLET ORAL at 20:28

## 2021-05-08 RX ADMIN — VANCOMYCIN HYDROCHLORIDE 1.25 G: 500 INJECTION, POWDER, LYOPHILIZED, FOR SOLUTION INTRAVENOUS at 02:56

## 2021-05-08 RX ADMIN — BACLOFEN 10 MG: 10 TABLET ORAL at 09:26

## 2021-05-08 RX ADMIN — Medication 1000 UNITS: at 09:26

## 2021-05-08 RX ADMIN — OXYCODONE HYDROCHLORIDE 5 MG: 5 TABLET ORAL at 20:27

## 2021-05-08 RX ADMIN — ATORVASTATIN CALCIUM 40 MG: 40 TABLET, FILM COATED ORAL at 18:32

## 2021-05-08 RX ADMIN — CLOPIDOGREL BISULFATE 75 MG: 75 TABLET ORAL at 09:26

## 2021-05-08 RX ADMIN — GABAPENTIN 300 MG: 300 CAPSULE ORAL at 20:28

## 2021-05-08 RX ADMIN — PANTOPRAZOLE SODIUM 20 MG: 20 TABLET, DELAYED RELEASE ORAL at 09:26

## 2021-05-08 RX ADMIN — GABAPENTIN 300 MG: 300 CAPSULE ORAL at 09:26

## 2021-05-08 RX ADMIN — AMLODIPINE BESYLATE 5 MG: 5 TABLET ORAL at 09:27

## 2021-05-08 ASSESSMENT — COGNITIVE AND FUNCTIONAL STATUS - GENERAL
WALKING IN HOSPITAL ROOM: 1 - TOTAL
AFFECT: WNL
EATING MEALS: 1 - TOTAL
HELP NEEDED FOR BATHING: 1 - TOTAL
HELP NEEDED FOR PERSONAL GROOMING: 1 - TOTAL
MOVING TO AND FROM BED TO CHAIR: 1 - TOTAL
CLIMB 3 TO 5 STEPS WITH RAILING: 1 - TOTAL
DRESSING REGULAR UPPER BODY CLOTHING: 1 - TOTAL
AFFECT: WNL
DRESSING REGULAR LOWER BODY CLOTHING: 1 - TOTAL
TOILETING: 1 - TOTAL
STANDING UP FROM CHAIR USING ARMS: 1 - TOTAL

## 2021-05-08 NOTE — PROGRESS NOTES
Patient: Tracy Fernandez  Location: [place] [address]  MRN: [id number]  Today's date: 5/8/2021     RN cleared for OT session.  End of session pt supine in bed with alarm on/incontinence pad and call bell/needs within reach.    Tracy is a 75 y.o. 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    Past Medical History  Tracy has a past medical history of Hypertension and Stroke (CMS/[facility]).    History of Present Illness   Tracy Fernandez is a 75 y.o. female who presents with pain and swelling in her right elbow since this past Thursday.  Patient denies any abrasions, cuts scrapes or trauma to her elbow, patient states that the pain and swelling began spontaneously, initially noticed by her home aide.  Patient directly admitted to the hospital to Dr. Almanza service for further work-up.  Patient denies any fevers or chills at this time.  Patient denies any numbness or paresthesias in her right hand. S/P resection arthroplasty right elbow 5/7. Pt is NWB on the right UE. Prior hx of CVA with residual left hemiparesis       OT Vitals    Date/Time Pulse BP BP Location BP Method Pt Position [facility]   05/08/21 0845 51 130/78 Left forearm Automatic Lying TGN          Prior Living Environment      Most Recent Value   Current Living Arrangements  residential facility   Living Environment Comment  Resident at [facility]. Dependent at baseline for bed mobility, transfers. Bed pan for tolieting, I with powerchair mobility with R UE           Prior Level of Function      Most Recent Value   Dominant Hand  right   Ambulation  completely dependent   Transferring  completely dependent   Toileting  completely dependent   Bathing  completely dependent   Dressing  completely dependent   Eating  independent   Communication  understands/communicates without  difficulty   Prior Level of Function Comment  Pt is dependent mariella lift for transfers, I with power chair mobility with R UE, dependent for ADL    Assistive Device Currently Used at Home  lift device, other (see comments) [Power WC, mariella at baseline]          Occupational Profile      Most Recent Value   Reason for Services/Referral  Diminished ADL status s/p I&D to R elbow   Successful Occupations  Retired   Environmental Supports and Barriers  Recieves total care at baseline from [facility]          OT Evaluation and Treatment - 05/08/21 0831        OT Time Calculation    Start Time  0831     Stop Time  0849     Time Calculation (min)  18 min        Session Details    Document Type  initial evaluation     Mode of Treatment  occupational therapy        General Information    Patient Profile Reviewed  yes     General Observations of Patient  rec'd supine in bed     Existing Precautions/Restrictions  weight bearing        Weight-bearing Status    Right UE Weight-Bearing Status  non weight-bearing (NWB)    sling in place       Cognition/Psychosocial    Affect/Mental Status (Cognition)  WNL     Orientation Status (Cognition)  oriented x 4     Follows Commands (Cognition)  follows multi-step commands     Cognitive Function  WNL     Comment, Cognition  Appears WNL for daily activities        Sensory Assessment (Somatosensory)    Sensory Assessment (Somatosensory)  UE sensation intact        Range of Motion (ROM)    Range of Motion  right upper extremity ROM deficit;left upper extremity ROM deficit     Left Upper Extremity (ROM)  left UE ROM is WFL except;shoulder;elbow;wrist;hand     Shoulder, Left (ROM)  trace mvmt-baseline     Elbow, Left (ROM)  trace mvmt-baseline     Wrist, Left (ROM)  trace mvmt-baseline     Hand, Left (ROM)  trace mvmt-baseline     Right Upper Extremity (ROM)  right UE ROM is WFL except;shoulder;elbow;wrist     Shoulder, Right (ROM)  deffered to sx and cast in place     Elbow, Right (ROM)   deffered to sx and cast in place     Wrist, Right (ROM)  deffered to sx and cast in place        Strength (Manual Muscle Testing)    Shoulder, Left (Strength)  trace     Elbow, Left (Strength)  trace     Wrist, Left (Strength)  trace     Hand, Left (Strength)  trace        Strength Comprehensive (MMT)    Comment  deffered R UE due to recent sx and cast in place        Bed Mobility    [place], Roll Left  dependent (less than 25% patient effort)     Verbal Cues (Roll Left)  technique;maintaining precautions     [place], Roll Right  dependent (less than 25% patient effort)     Verbal Cues (Roll Right)  technique;maintaining precautions     Comment (Bed Mobility)  D at baseline using mariella lift        Transfers    Transfers  other (see comments);toilet transfer    uses mariella for bed transfer to power w/c.         Bed to Chair Transfer    Comment  D at baseline via mariella lift        Chair to Bed Transfer    Comment  D at baseline via mariella lift        Sit to Stand Transfer    Comment  does not complete at baseline        Stand to Sit Transfer    Comment  does not complete at baseline        Toilet Transfer    Comment  does not complete at baseline.  reports use of bed pan        Balance    Comment, Balance  unable to safely assess supported sitting balance.  pt does not complete unsupported sitting at baseline        Motor Skills    Motor Skills  functional endurance     Functional Endurance  Fair        Therapeutic Exercise    Therapeutic Exercise  hand        Hand (Therapeutic Exercise)    Exercise Position/Type  supine     General Exercise  right;finger flexion/extension     Reps and Sets  x10 reps     Comment  per order for ROM,  wrist ROM unable to be completed due to pain and placement of hard splint        Lower Body Dressing    Tasks  don;socks     [place] Assistance  dons/doffs right sock;dons/doffs left sock     Branch  dependent (less than 25% patient effort)     Position  supine     Comment  D  at baseline        Self-Feeding    [place]  liquids to mouth;dependent (less than 25% patient effort)     Position  sitting up in bed     Comment  unable to use R hand due to recent sx        BADL Safety/Performance    Impairments, BADL Safety/Performance  balance;endurance/activity tolerance;muscle tone abnormality;range of motion;strength;pain     Skilled BADL Treatment/Intervention  compensatory training        AM-PAC (TM) - ADL (Current Function)    Putting on and taking off regular lower body clothing?  1 - Total     Bathing?  1 - Total     Toileting?  1 - Total     Putting on/taking off regular upper body clothing?  1 - Total     How much help for taking care of personal grooming?  1 - Total     Eating meals?  1 - Total     AM-PAC (TM) ADL Score  6        Progress Summary (OT)    Daily Outcome Statement (OT)  OT eval completed.  Pt at baseline is D for functional transfers with use of mariella sling and reports total assistance for dressing/toileting tasks.  Pt was able to I manage power w/c and complete self feeding at this time is D for those tasks due to hard splint in place limiting ROM.  AMPA 6.  no further needs as pt is near baseline.       Symptoms Noted During/After Treatment  increased pain        Therapy Plan Review/Discharge Plan (OT)    OT Recommended Discharge Disposition  skilled nursing facility     Anticipated Equipment Needs At Discharge (OT)  --    pt owns all necessary equipment                 Education Documentation  Home Safety, taught by Delores Martini, STEPHEN at 5/8/2021 10:40 AM.  Learner: Patient  Readiness: Acceptance  Method: Explanation  Response: Verbalizes Understanding  Comment: OT role, safe transfer techniques, R UE ROM to hand and positioning

## 2021-05-08 NOTE — PROGRESS NOTES
74 y/o patient admitted for surgery, POD #1 s/p resection of infected humeral component of total elbow arthroplasty.  The patient has a previous ulnar component resection for infection some time ago.  Arm immobilized in a splint and sling.  Patient is having pain which is well controlled with pain medication.  Pain level rated 3/10 this morning.  [place] wound dressing in place .  Voiding without difficulty.      AAOx3, NAD.  VSS.  Surgicaldressing is clean/dry/intact, NV intact at hand and fingers. Swelling minimal at hand.  Hand warm to touch with brisk capillary refill.    Imaging:  Postoperative radiographs of the right elbow:  Humeral and ulnar components resected.  No acute fractures or dislocations noted.    Plan:   1.  Continue pain management.  2. IV antibiotics as per ID  3.  Discharge status: Pending   4.  DVT prophylaxis

## 2021-05-08 NOTE — PROGRESS NOTES
Patient: Tracy Fernandez  Location: [place] [address]  MRN: [id number]  Today's date: 5/8/2021   In bed, alarm on, legs elevated, draw sheet and incontinence pad, call bell / telephone in reach.     Tracy is a 75 y.o. 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    Past Medical History  Tracy has a past medical history of Hypertension and Stroke (CMS/[facility]).    History of Present Illness   Tracy Fernandez is a 75 y.o. female who presents with pain and swelling in her right elbow since this past Thursday.  Patient denies any abrasions, cuts scrapes or trauma to her elbow, patient states that the pain and swelling began spontaneously, initially noticed by her home aide.  Patient directly admitted to the hospital to Dr. Almanza service for further work-up.  Patient denies any fevers or chills at this time.  Patient denies any numbness or paresthesias in her right hand. S/P resection arthroplasty right elbow 5/7. Pt is NWB on the right UE. Prior hx of CVA with residual left hemiparesis       PT Vitals    Date/Time Pulse SpO2 BP BP Location BP Method Pt Position [facility]   05/08/21 0830 54 94 % 129/81 Left forearm Automatic Lying TGN   05/08/21 0845 51 -- 130/78 Left forearm Automatic Lying TGN      PT Pain    Date/Time Pain Type Location Rating: Rest Rating: Activity Interventions [facility]   05/08/21 0830 Pain Assessment elbow 7 7 position adjusted TGN          Prior Living Environment      Most Recent Value   Current Living Arrangements  residential facility   Living Environment Comment  Resident at [facility]. Dependent at baseline for bed mobility, transfers. Bed pan for tolieting, I with powerchair mobility with R UE           Prior Level of Function      Most Recent Value   Dominant Hand  right   Ambulation  completely dependent    Transferring  completely dependent   Toileting  completely dependent   Bathing  completely dependent   Dressing  completely dependent   Eating  independent   Communication  understands/communicates without difficulty   Prior Level of Function Comment  Pt is dependent mariella lift for transfers, I with power chair mobility with R UE, dependent for ADL    Assistive Device Currently Used at Home  lift device, other (see comments) [Power WC, mariella at baseline]          PT Evaluation and Treatment - 05/08/21 0830        PT Time Calculation    Start Time  0830     Stop Time  0853     Time Calculation (min)  23 min        Session Details    Document Type  initial evaluation     Mode of Treatment  physical therapy        General Information    Patient Profile Reviewed  yes     Onset of Illness/Injury or Date of Surgery  05/05/21     Referring Physician  Archie      General Observations of Patient  Recvd resting supine in bed      Existing Precautions/Restrictions  weight bearing        Weight-bearing Status    Right UE Weight-Bearing Status  non weight-bearing (NWB)        Cognition/Psychosocial    Affect/Mental Status (Cognition)  WNL     Orientation Status (Cognition)  oriented x 4     Follows Commands (Cognition)  follows multi-step commands        Sensory    Hearing Status  WNL        Vision Assessment/Intervention    Visual Impairment/Limitations  WFL        Sensory Assessment (Somatosensory)    Sensory Assessment (Somatosensory)  sensation intact;bilateral LE     Left LE Sensory Assessment  general sensation     Right LE Sensory Assessment  general sensation     Bilateral LE Sensory Assessment  general sensation        Range of Motion (ROM)    Range of Motion  ROM is WFL;right lower extremity;left lower extremity ROM deficit     Left Lower Extremity (ROM)  other (see comments)    Min active movement L LE S/P R CVA     Right Lower Extremity (ROM)  other (see comments)    limited active DF and knee flexion L         Strength (Manual Muscle Testing)    Left Lower Extremity Strength  hip;knee;ankle     Hip, Left (Strength)  2/5     Knee, Left (Strength)  2/5     Ankle, Left (Strength)  2/5     Hip, Right (Strength)  3-/5     Knee, Right (Strength)  3-/5     Ankle, Right (Strength)  3-/5        Bed Mobility    [place], Roll Left  dependent (less than 25% patient effort);2 person assist;verbal cues     Verbal Cues (Roll Left)  technique;maintaining precautions     [place], Roll Right  dependent (less than 25% patient effort);2 person assist;verbal cues     Verbal Cues (Roll Right)  safety;technique     [place], Supine to Sit  unable to assess;not tested     Assistive Device  draw sheet;head of bed elevated     Comment (Bed Mobility)  Pt dependent for BM at baseline. Lore         Bed to Chair Transfer    [place], Bed to Chair  unable to assess     Comment  dependent at baseline. Lore         Chair to Bed Transfer    [place], Chair to Bed  unable to assess     Comment  dependent at baseline         Sit to Stand Transfer    [place], Sit to Stand Transfer  unable to assess     Comment  dependent at baseline         Stand to Sit Transfer    [place], Stand to Sit Transfer  not tested        Gait Training    [place], Gait  other (see comments)    Non ambulatory at base        Stairs Training    [place], Stairs  not tested        Balance    Comment, Balance  Pt with inctreased right elbow pain with rolling. Returned to supine with support         AM-PAC (TM) - Mobility (Current Function)    Turning from your back to your side while in a flat bed without using bedrails?  1 - Total     Moving from lying on your back to sitting on the side of a flat bed without using bedrails?  1 - Total     Moving to and from a bed to a chair?  1 - Total     Standing up from a chair using your arms?  1 - Total     To walk in a hospital room?  1 - Total     Climbing 3-5 steps with a railing?  1 - Total     AM-PAC  (TM) Mobility Score  6        Therapy Assessment/Plan (PT)    Rehab Potential (PT)  other (see comments)     Therapy Frequency (PT)  evaluation only     Criteria for Skilled Interventions Met (PT)  no;does not meet criteria for skilled intervention    dependent at baseline     Functional Level at Time of Evaluation (PT)  Dependent        Progress Summary (PT)    Daily Outcome Statement (PT)  Pt is dependent x 2 for bed mobility. Lore for transfers. [facility] - 6. Suggest return to [facility] when stable. no skilled PT needs for this level of care      Symptoms Noted During/After Treatment  increased pain    R elbow.        Therapy Plan Review/Discharge Plan (PT)    PT Recommended Discharge Disposition  extended care facility;other (see comments)    Return to extended care facility     Anticipated Equipment Needs at Discharge (PT)  none     Therapy Plan Review (PT)  evaluation/treatment results reviewed;patient                       Education Documentation  Treatment Plan, taught by Ross Santiago, PT at 5/8/2021  9:55 AM.  Learner: Patient  Readiness: Acceptance  Method: Explanation  Response: Verbalizes Understanding  Comment: NWB of the right upper extremity

## 2021-05-08 NOTE — PROGRESS NOTES
Ortho Daily Progress Note    Subjective     Interval History: Patient seen and examined at bedside resting comfortably.  Pain controlled.  Denies tingling, numbness or weakness in RUE.  Denies f/c/n/v/cp/sob.       Objective     Vital signs in last 24 hours:  Vitals:    05/08/21 0631   BP:    Pulse: (!) 55   Resp:    Temp:    SpO2: 94%         Imaging:  X-ray right elbow: demonstrates resection arthroplasty with retained cerclage wires of humerus and screw along proximal ulna with significant bone loss for radius and ulna.         Physical Exam:  Gen: awake and alert, no acute distress  HEENT: normocephalic, atraumatic  Cards: regular rate, bcr  Pulm: no increased work of breathing, no audible wheezing    RUE:   Splint in place, preveena with no output and suction intact, good seal.  Sensation intact distally rad ulnar median  Distal motor intact m/u/r/AIN/PIN  No pain with passive stretch of fingers and hand  +2 radial pulse  Cap refill < 2 secs    A/P:   75 y.o. female status post right JACOB 2009 with subsequent patient had several revision surgeries, most recently in antibiotic spacer, now presenting with new onset atraumatic pain and swelling of the right elbow.  Now s/p r elbow I&D with resection arthroplasty on 5/7 with Dr. Almanza.    Plan:  Vancomycin per ID recs until cx finalize  NWB RUE in splint, ctm preveena   DVT/PE ppx: ASA 81mg QD, flowtrons, home plavix restarted today per cards recs  IO cx NGTD, R elbow aspirate from 5/5 NGTD, will ctm   PT/OT, baseline left hemiplegia, now NWB RUE  Multimodal pain control  Appreciate med/ID recs      Garland Poon, DO   Orthopedic Surgery PGY4  Pager: [phone]

## 2021-05-08 NOTE — PLAN OF CARE
Problem: Adult Inpatient Plan of Care  Goal: Plan of Care Review  Outcome: Progressing  Flowsheets (Taken 5/8/2021 1038)  Progress: improving  Plan of Care Reviewed With: patient  Outcome Summary: OT eval completed.  Pt is D for log rolling and per report mariella lift at baseline for bed transfer.  D at baseline for dressing ADLs.  no further OT services

## 2021-05-08 NOTE — PLAN OF CARE
Problem: Adult Inpatient Plan of Care  Goal: Plan of Care Review  Flowsheets  Taken 5/8/2021 0953 by Ross Santiago PT  Outcome Summary: PT eval complted. Pt is dependent x 2 for bed mobility. Dependent at baseline. No skilled PT needs identified for this level of care. Return to ECF  Taken 5/8/2021 0324 by Minna Fernandez, RN  Plan of Care Reviewed With: patient

## 2021-05-08 NOTE — PROGRESS NOTES
Major Events:  OR - debridement and explant of humerus hardware     Subjective:  Sleepy     Temp:  [36.3 °C (97.3 °F)-37.7 °C (99.8 °F)] 36.3 °C (97.3 °F)  Heart Rate:  [58-73] 69  Resp:  [10-18] 16  BP: (116-160)/(56-85) 139/65      SpO2 Readings from Last 3 Encounters:   05/07/21 93%   04/02/18 93%     Anti-infectives (From admission, onward)    Start     Dose/Rate Route Frequency Ordered Stop    05/07/21 1830  ceFAZolin (ANCEF) NSS IVPB piggyback 2 g      2 g  100 mL/hr over 30 Minutes intravenous Every 8 hours interval 05/07/21 1205 05/08/21 1029    05/07/21 0045  vancomycin 1.25 gram/250 mL IVPB in NSS      1.25 g  166.7 mL/hr over 90 Minutes intravenous Every 12 hours interval 05/06/21 1713          Physical Exam:  Skin: No rash  Sclera: Anicteric  Lungs: clr x bases  CV: S1, S2 nl, no embolic changes  Abd: Soft, nt  Extr: No jt eff, no edema  Neuro: Alert, lucid    Lab Results   Component Value Date    WBC 6.16 05/06/2021    HGB 9.9 (L) 05/06/2021    HCT 31.6 (L) 05/06/2021    MCV 84.3 05/06/2021     05/06/2021     Lab Results   Component Value Date    GLUCOSE 94 05/06/2021    CALCIUM 8.2 (L) 05/06/2021     05/06/2021    K 4.0 05/06/2021    CO2 20 (L) 05/06/2021     05/06/2021    BUN 6 (L) 05/06/2021    CREATININE 0.4 (L) 05/06/2021     Lab Results   Component Value Date    ALBUMIN 4.6 05/25/2017    BILITOT 0.9 05/25/2017    ALKPHOS 85 05/25/2017    AST 16 05/25/2017    ALT 19 05/25/2017    PROTEIN 7.1 05/25/2017     Microbiology Results     Procedure Component Value Units Date/Time    Anaerobic Culture / Smear (includes Aerobic Culture) Elbow, Right [[id number]] Collected: 05/07/21 1055    Specimen: Tissue from Elbow, Right Updated: 05/07/21 1707     Gram Stain Result 2+ WBC      No organisms seen    Body Fluid Culture/Smear Elbow, Right [[id number]] Collected: 05/07/21 1050    Specimen: Synovial Fluid from Elbow, Right Updated: 05/07/21 1710     Gram Stain Result 4+ WBC      No  organisms seen    Blood Culture Blood, Venous [[id number]] Collected: 05/06/21 0051    Specimen: Blood, Venous Updated: 05/07/21 0500     Culture No growth at 18-24 hours    Blood Culture Blood, Venous [[id number]] Collected: 05/06/21 0051    Specimen: Blood, Venous Updated: 05/07/21 0500     Culture No growth at 18-24 hours    SARS-CoV-2 (COVID-19), PCR Nasopharynx [[id number]]  (Normal) Collected: 05/05/21 1835    Specimen: Nasopharyngeal Swab from Nasopharynx Updated: 05/05/21 1936    Narrative:      The following orders were created for panel order SARS-CoV-2 (COVID-19), PCR Nasopharynx.  Procedure                               Abnormality         Status                     ---------                               -----------         ------                     SARS-CoV-2 (COVID-19), P...[[id number]]  Normal              Final result                 Please view results for these tests on the individual orders.    SARS-CoV-2 (COVID-19), PCR Nasopharynx [[id number]]  (Normal) Collected: 05/05/21 1835    Specimen: Nasopharyngeal Swab from Nasopharynx Updated: 05/05/21 1936     SARS-CoV-2 (COVID-19) Negative    Anaerobic Culture / Smear (includes Aerobic Culture) Elbow, Right [[id number]] Collected: 05/05/21 1758    Specimen: Abscess Fluid from Elbow, Right Updated: 05/07/21 1523     Culture No growth at 48 hours     Gram Stain Result 4+ WBC      No organisms seen        Impression    Suspected infected elbow prothesis  Explant today c debridement  OR cx pnd  Cont vanco    YS MD Eleni  Pager [phone]

## 2021-05-08 NOTE — PROGRESS NOTES
Vancomycin Dosing by Pharmacy Consult Follow up    Tracy Fernandez is a 75 y.o. female who has been consulted for vancomycin dosing for bone/joint infection.    Reviewed relevant clinical data including weight, renal function, previous vancomycin doses, and vancomycin levels  Creatinine   Date/Time Value Ref Range Status   05/06/2021 1048 0.4 (L) 0.6 - 1.1 mg/dL Final   05/06/2021 0051 0.4 (L) 0.6 - 1.1 mg/dL Final     Vancomycin Tr   Date/Time Value Ref Range Status   05/08/2021 0223 17.2 (H) 10.0 - 15.0 ug/mL Final     Comment:     For all patients with complicated infections with methicillin resistant Staphylococcus aureus (MRSA), e.g. endocarditis, osteomyelitis, meningitis, pneumonia; the vancomycin trough therpeutic range is 15-20 ug/mL.          Vancomycin Administrations (last 96 hours)       Date/Time Action Medication Dose Rate    05/08/21 0256 New Bag    vancomycin 1.25 gram/250 mL IVPB in NSS 1.25 g 166.7 mL/hr    05/07/21 1441 New Bag    vancomycin 1.25 gram/250 mL IVPB in NSS 1.25 g 166.7 mL/hr    05/07/21 0131 New Bag    vancomycin 1.25 gram/250 mL IVPB in NSS 1.25 g 166.7 mL/hr    05/06/21 1241 New Bag    vancomycin 1.25 gram/250 mL IVPB in NSS 1.25 g 166.7 mL/hr    05/06/21 0120 New Bag    vancomycin 1.25 gram/250 mL IVPB in NSS 1.25 g 166.7 mL/hr    05/05/21 2037 New Bag    vancomycin 1.25 gram/250 mL IVPB in NSS 1.25 g 166.7 mL/hr              Assessment/Plan  The patient is ordered vancomycin dosing by pharmacy.      The patient’s renal function; is stable (need updated Scr- last drawn 5/6)    Vancomycin trough level 17.2 on maintenance dose of 1250 mg IV Q 12 H with a goal trough 15-20 ug/mL.      Will continue vancomycin 1250 mg IV Q 12 H .      Next trough:  5/10/21 @ 02:00    Pharmacy will continue to follow the patient’s vancomycin dosing daily during this course of therapy.      Please call vancomycin levels to the pharmacy.  Abbey Khan

## 2021-05-08 NOTE — HOSPITAL COURSE
Tracy is a 75 y.o. 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    Past Medical History  Tracy has a past medical history of Hypertension and Stroke (CMS/[facility]).    History of Present Illness   Tracy Fernandez is a 75 y.o. female who presents with pain and swelling in her right elbow since this past Thursday.  Patient denies any abrasions, cuts scrapes or trauma to her elbow, patient states that the pain and swelling began spontaneously, initially noticed by her home aide.  Patient directly admitted to the hospital to Dr. Almanza service for further work-up.  Patient denies any fevers or chills at this time.  Patient denies any numbness or paresthesias in her right hand. S/P resection arthroplasty right elbow 5/7. Pt is NWB on the right UE. Prior hx of CVA with residual left hemiparesis

## 2021-05-08 NOTE — PROGRESS NOTES
Hospital Medicine Service -  Daily Progress Note       SUBJECTIVE   Right arm pain is controlled  Denies shortness of breath, nausea or dizziness     OBJECTIVE      Vital signs in last 24 hours:  Temp:  [36.3 °C (97.3 °F)-37.1 °C (98.8 °F)] 36.7 °C (98.1 °F)  Heart Rate:  [52-73] 55  Resp:  [10-18] 18  BP: (118-145)/(56-81) 127/58    Intake/Output Summary (Last 24 hours) at 5/8/2021 0830  Last data filed at 5/8/2021 0603  Gross per 24 hour   Intake 1660 ml   Output 625 ml   Net 1035 ml       PHYSICAL EXAMINATION      Physical Exam  Vitals and nursing note reviewed.   Constitutional:       Appearance: Normal appearance. She is well-developed. She is obese.   Eyes:      General: No scleral icterus.  Neck:      Vascular: No JVD.   Cardiovascular:      Rate and Rhythm: Normal rate and regular rhythm.      Heart sounds: Murmur heard.     Pulmonary:      Effort: Pulmonary effort is normal.      Breath sounds: Normal breath sounds.   Abdominal:      General: Bowel sounds are normal.      Palpations: Abdomen is soft. There is no mass.      Tenderness: There is no abdominal tenderness.   Musculoskeletal:         General: Swelling present.      Cervical back: Neck supple.      Comments: Upper and lower extremities   Neurological:      Mental Status: She is alert and oriented to person, place, and time.   Psychiatric:         Behavior: Behavior normal.        LABS / IMAGING / TELE      Labs  I have reviewed the patient's labs.  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  Excisional irrigation and debridement, right elbow (including skin, subcutaneous fat, fascia, muscle, and bone) 2. Removal of humeral component, right elbow my 7  Orthopedic surgery is following    Infectious diseases following  On  vancomycin    Anemia  Assessment & Plan  Likely anemia of chronic disease  Monitor    Hypokalemia  Assessment & Plan  Replete as needed    Gastroesophageal reflux disease without esophagitis  Assessment & Plan  Continue Protonix    Rheumatoid arthritis (CMS/HCC)  Assessment & Plan  Not on DMARD    Essential hypertension  Assessment & Plan  Continue metoprolol, amlodipine  Blood pressure is controlled    S/P AVR (aortic valve replacement)  Assessment & Plan  History of porcine aortic valve replacement in 2005 and TAVR in 2018.  Stable    Cardiology eval noted    History of CVA with residual deficit  Assessment & Plan  In 2017 and has left hemiplegia and is bedbound    Continue aspirin and Plavix  Continue baclofen and gabapentin           VTE Assessment: Padua    Code Status: DNR (A.N.D.)  Estimated discharge date: 5/11/2021     Becca Barros MD  5/8/2021

## 2021-05-08 NOTE — PLAN OF CARE
Problem: Adult Inpatient Plan of Care  Goal: Plan of Care Review  Flowsheets (Taken 5/8/2021 2994)  Progress: improving  Plan of Care Reviewed With: patient  Outcome Summary: Pt tolerates diet. Purewick in place for incontinence. Meds given for pain per order. LUE +2 edema, RUE in sling, dressing intact. Wound vac intact, no drainage.

## 2021-05-09 PROBLEM — R79.89 ABNORMAL LFTS: Status: ACTIVE | Noted: 2021-05-09

## 2021-05-09 LAB
ALBUMIN SERPL-MCNC: 2.5 G/DL (ref 3.4–5)
ALP SERPL-CCNC: 358 IU/L (ref 35–126)
ALT SERPL-CCNC: 45 IU/L (ref 11–54)
AST SERPL-CCNC: 26 IU/L (ref 15–41)
BILIRUB DIRECT SERPL-MCNC: 0.2 MG/DL
BILIRUB SERPL-MCNC: 0.7 MG/DL (ref 0.3–1.2)
CROSSMATCH: NORMAL
CROSSMATCH: NORMAL
ISBT CODE: 6200
ISBT CODE: 6200
PRODUCT CODE: NORMAL
PRODUCT CODE: NORMAL
PRODUCT STATUS: NORMAL
PRODUCT STATUS: NORMAL
PROT SERPL-MCNC: 5.4 G/DL (ref 6–8.2)
SPECIMEN EXP DATE BLD: NORMAL
SPECIMEN EXP DATE BLD: NORMAL
UNIT ABO: NORMAL
UNIT ABO: NORMAL
UNIT ID: NORMAL
UNIT ID: NORMAL
UNIT RH: POSITIVE
UNIT RH: POSITIVE
UNIT VOLUME: 325 ML
UNIT VOLUME: 325 ML

## 2021-05-09 PROCEDURE — 36415 COLL VENOUS BLD VENIPUNCTURE: CPT | Performed by: HOSPITALIST

## 2021-05-09 PROCEDURE — 25000000 HC PHARMACY GENERAL: Performed by: NURSE PRACTITIONER

## 2021-05-09 PROCEDURE — 63700000 HC SELF-ADMINISTRABLE DRUG: Performed by: NURSE PRACTITIONER

## 2021-05-09 PROCEDURE — 80076 HEPATIC FUNCTION PANEL: CPT | Performed by: HOSPITALIST

## 2021-05-09 PROCEDURE — 25800000 HC PHARMACY IV SOLUTIONS: Performed by: NURSE PRACTITIONER

## 2021-05-09 PROCEDURE — 99232 SBSQ HOSP IP/OBS MODERATE 35: CPT | Performed by: HOSPITALIST

## 2021-05-09 PROCEDURE — 63600000 HC DRUGS/DETAIL CODE: Performed by: NURSE PRACTITIONER

## 2021-05-09 PROCEDURE — 12000000 HC ROOM AND CARE MED/SURG

## 2021-05-09 RX ORDER — BISACODYL 10 MG/1
10 SUPPOSITORY RECTAL DAILY PRN
Status: DISCONTINUED | OUTPATIENT
Start: 2021-05-09 | End: 2021-05-10 | Stop reason: HOSPADM

## 2021-05-09 RX ADMIN — GABAPENTIN 300 MG: 300 CAPSULE ORAL at 08:58

## 2021-05-09 RX ADMIN — OXYCODONE HYDROCHLORIDE 5 MG: 5 TABLET ORAL at 09:02

## 2021-05-09 RX ADMIN — ATORVASTATIN CALCIUM 40 MG: 40 TABLET, FILM COATED ORAL at 17:42

## 2021-05-09 RX ADMIN — CETIRIZINE HYDROCHLORIDE 10 MG: 10 TABLET, FILM COATED ORAL at 09:00

## 2021-05-09 RX ADMIN — AMLODIPINE BESYLATE 5 MG: 5 TABLET ORAL at 08:58

## 2021-05-09 RX ADMIN — VANCOMYCIN HYDROCHLORIDE 1.25 G: 500 INJECTION, POWDER, LYOPHILIZED, FOR SOLUTION INTRAVENOUS at 15:17

## 2021-05-09 RX ADMIN — CLOPIDOGREL BISULFATE 75 MG: 75 TABLET ORAL at 08:57

## 2021-05-09 RX ADMIN — OXYCODONE HYDROCHLORIDE 5 MG: 5 TABLET ORAL at 17:42

## 2021-05-09 RX ADMIN — BACLOFEN 10 MG: 10 TABLET ORAL at 08:57

## 2021-05-09 RX ADMIN — PANTOPRAZOLE SODIUM 20 MG: 20 TABLET, DELAYED RELEASE ORAL at 08:57

## 2021-05-09 RX ADMIN — GABAPENTIN 300 MG: 300 CAPSULE ORAL at 20:48

## 2021-05-09 RX ADMIN — BACLOFEN 10 MG: 10 TABLET ORAL at 20:49

## 2021-05-09 RX ADMIN — VANCOMYCIN HYDROCHLORIDE 1.25 G: 500 INJECTION, POWDER, LYOPHILIZED, FOR SOLUTION INTRAVENOUS at 02:38

## 2021-05-09 RX ADMIN — METOPROLOL SUCCINATE 25 MG: 25 TABLET, EXTENDED RELEASE ORAL at 08:58

## 2021-05-09 RX ADMIN — BACLOFEN 10 MG: 10 TABLET ORAL at 14:50

## 2021-05-09 RX ADMIN — Medication 1000 UNITS: at 08:58

## 2021-05-09 RX ADMIN — ASPIRIN 81 MG: 81 TABLET, DELAYED RELEASE ORAL at 08:57

## 2021-05-09 NOTE — PLAN OF CARE
Plan of Care Review  Plan of Care Reviewed With: patient  Progress: improving  Outcome Summary: Pt resting in bed, reports mild pain. Discussed PRN and scheduled medications with pt, pt verbalized understanding. Adpative call bell within reach, bed alarm on.

## 2021-05-09 NOTE — PROGRESS NOTES
Ortho Daily Progress Note    Subjective     Interval History: Patient seen and examined at bedside resting comfortably.  Pain controlled, endorses some elizabeth-incisional pain intermittently.  Denies tingling, numbness or weakness in RUE.  Denies f/c/n/v/cp/sob.       Objective     Vital signs in last 24 hours:  Vitals:    05/08/21 2049   BP: 129/69   Pulse: (!) 58   Resp: 17   Temp: 36.7 °C (98.1 °F)   SpO2:          Imaging:  X-ray right elbow: demonstrates resection arthroplasty with retained cerclage wires of humerus and screw along proximal ulna with significant bone loss for radius and ulna.         Physical Exam:  Gen: awake and alert, no acute distress  HEENT: normocephalic, atraumatic  Cards: regular rate, bcr  Pulm: no increased work of breathing, no audible wheezing    RUE:   Splint in place, preveena with no output and suction intact, good seal.  Sensation intact distally rad ulnar median  Distal motor intact m/u/r/AIN/PIN  No pain with passive stretch of fingers and hand  +2 radial pulse  Cap refill < 2 secs    A/P:   75 y.o. female status post right JACOB 2009 with subsequent patient had several revision surgeries, most recently in antibiotic spacer, now presenting with new onset atraumatic pain and swelling of the right elbow.  Now s/p r elbow I&D with resection arthroplasty on 5/7 with Dr. Almanza.    Plan:  Vancomycin per ID recs until cx finalize  NWB RUE in splint, ctm preveena   DVT/PE ppx: ASA 81mg QD, flowtrons, home plavix restarted today per cards recs  IO cx NGTD & R elbow aspirate from 5/5 NGTD last updated 5/8  PT/OT, baseline left hemiplegia, now NWB RUE, PT/OT recommending DC to SNF  Multimodal pain control  Appreciate med/ID recs      Garland Poon DO   Orthopedic Surgery PGY4  Pager: [phone]

## 2021-05-09 NOTE — PROGRESS NOTES
Hospital Medicine Service -  Daily Progress Note       SUBJECTIVE   Reports the pain is controlled  Denies shortness of breath     OBJECTIVE      Vital signs in last 24 hours:  Temp:  [36.7 °C (98.1 °F)-37.4 °C (99.3 °F)] 37.4 °C (99.3 °F)  Heart Rate:  [56-62] 56  Resp:  [16-17] 16  BP: (124-133)/(60-69) 133/68    Intake/Output Summary (Last 24 hours) at 5/9/2021 1842  Last data filed at 5/9/2021 1737  Gross per 24 hour   Intake 370 ml   Output 1550 ml   Net -1180 ml       PHYSICAL EXAMINATION      Physical Exam  Vitals and nursing note reviewed.   Constitutional:       Appearance: Normal appearance. She is well-developed. She is obese.   Eyes:      General: No scleral icterus.  Neck:      Vascular: No JVD.   Cardiovascular:      Rate and Rhythm: Normal rate and regular rhythm.      Heart sounds: Normal heart sounds.   Pulmonary:      Effort: Pulmonary effort is normal.      Breath sounds: Normal breath sounds.   Abdominal:      General: Bowel sounds are normal.      Palpations: Abdomen is soft. There is no mass.      Tenderness: There is no abdominal tenderness.   Musculoskeletal:         General: No swelling.      Cervical back: Neck supple.   Neurological:      Mental Status: She is alert and oriented to person, place, and time.   Psychiatric:         Behavior: Behavior normal.        LABS / IMAGING / TELE      Labs  I have reviewed the patient's labs.  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  Excisional irrigation and debridement, right elbow (including skin, subcutaneous fat, fascia, muscle, and bone) 2. Removal of humeral component, right elbow May 7  OR culture: Staph epi  Orthopedic surgery is following    Infectious diseases following  On vancomycin    Abnormal LFTs  Assessment  & Plan  Likely secondary to infection  Improving    Anemia  Assessment & Plan  Likely anemia of chronic disease  Monitor    Hypokalemia  Assessment & Plan  Replete as needed    Gastroesophageal reflux disease without esophagitis  Assessment & Plan  Continue Protonix    Rheumatoid arthritis (CMS/HCC)  Assessment & Plan  Not on DMARD    Essential hypertension  Assessment & Plan  Continue metoprolol, amlodipine  Blood pressure is controlled    S/P AVR (aortic valve replacement)  Assessment & Plan  History of porcine aortic valve replacement in 2005 and TAVR in 2018.  Stable    Cardiology eval noted    History of CVA with residual deficit  Assessment & Plan  In 2017 and has left hemiplegia and is bedbound    Continue aspirin and Plavix  Continue baclofen and gabapentin         VTE Assessment: Padua    Code Status: DNR (A.N.D.)  Estimated discharge date: 5/11/2021     Becca Barros MD  5/9/2021

## 2021-05-10 ENCOUNTER — APPOINTMENT (INPATIENT)
Dept: RADIOLOGY | Facility: HOSPITAL | Age: 76
DRG: 493 | End: 2021-05-10
Attending: NURSE PRACTITIONER
Payer: MEDICARE

## 2021-05-10 VITALS
BODY MASS INDEX: 38.15 KG/M2 | SYSTOLIC BLOOD PRESSURE: 184 MMHG | DIASTOLIC BLOOD PRESSURE: 81 MMHG | WEIGHT: 237.4 LBS | OXYGEN SATURATION: 96 % | HEIGHT: 66 IN | RESPIRATION RATE: 18 BRPM | TEMPERATURE: 99.1 F | HEART RATE: 65 BPM

## 2021-05-10 LAB
ALBUMIN SERPL-MCNC: 2.4 G/DL (ref 3.4–5)
ALP SERPL-CCNC: 322 IU/L (ref 35–126)
ALT SERPL-CCNC: 38 IU/L (ref 11–54)
ANION GAP SERPL CALC-SCNC: 9 MEQ/L (ref 3–15)
AST SERPL-CCNC: 19 IU/L (ref 15–41)
BILIRUB DIRECT SERPL-MCNC: 0.1 MG/DL
BILIRUB SERPL-MCNC: 0.7 MG/DL (ref 0.3–1.2)
BUN SERPL-MCNC: 11 MG/DL (ref 8–20)
CALCIUM SERPL-MCNC: 7.7 MG/DL (ref 8.9–10.3)
CHLORIDE SERPL-SCNC: 105 MEQ/L (ref 98–109)
CO2 SERPL-SCNC: 25 MEQ/L (ref 22–32)
CREAT SERPL-MCNC: 0.5 MG/DL (ref 0.6–1.1)
DATE+TIME DOSE: 1517
DATE+TIME DOSE: ABNORMAL
ERYTHROCYTE [DISTWIDTH] IN BLOOD BY AUTOMATED COUNT: 16.6 % (ref 11.7–14.4)
GFR SERPL CREATININE-BSD FRML MDRD: >60 ML/MIN/1.73M*2
GLUCOSE SERPL-MCNC: 124 MG/DL (ref 70–99)
GRAM STN SPEC: NORMAL
GRAM STN SPEC: NORMAL
HCT VFR BLDCO AUTO: 29.8 % (ref 35–45)
HGB BLD-MCNC: 9.4 G/DL (ref 11.8–15.7)
MCH RBC QN AUTO: 26.4 PG (ref 28–33.2)
MCHC RBC AUTO-ENTMCNC: 31.5 G/DL (ref 32.2–35.5)
MCV RBC AUTO: 83.7 FL (ref 83–98)
MICROORGANISM SPEC CULT: NORMAL
PDW BLD AUTO: 9.8 FL (ref 9.4–12.3)
PLATELET # BLD AUTO: 392 K/UL (ref 150–369)
POTASSIUM SERPL-SCNC: 3.3 MEQ/L (ref 3.6–5.1)
PROT SERPL-MCNC: 5.4 G/DL (ref 6–8.2)
RBC # BLD AUTO: 3.56 M/UL (ref 3.93–5.22)
SODIUM SERPL-SCNC: 139 MEQ/L (ref 136–144)
VANCOMYCIN TROUGH SERPL-MCNC: 21.7 UG/ML (ref 10–15)
WBC # BLD AUTO: 8.06 K/UL (ref 3.8–10.5)

## 2021-05-10 PROCEDURE — 02HV33Z INSERTION OF INFUSION DEVICE INTO SUPERIOR VENA CAVA, PERCUTANEOUS APPROACH: ICD-10-PCS | Performed by: RADIOLOGY

## 2021-05-10 PROCEDURE — B518ZZA FLUOROSCOPY OF SUPERIOR VENA CAVA, GUIDANCE: ICD-10-PCS | Performed by: RADIOLOGY

## 2021-05-10 PROCEDURE — 27200000 HC STERILE SUPPLY

## 2021-05-10 PROCEDURE — 36100360 IR PICC PLACEMENT

## 2021-05-10 PROCEDURE — 63600000 HC DRUGS/DETAIL CODE: Performed by: NURSE PRACTITIONER

## 2021-05-10 PROCEDURE — 63600105 HC IODINE BASED CONTRAST: Mod: JW | Performed by: NURSE PRACTITIONER

## 2021-05-10 PROCEDURE — 85027 COMPLETE CBC AUTOMATED: CPT | Performed by: HOSPITALIST

## 2021-05-10 PROCEDURE — 25800000 HC PHARMACY IV SOLUTIONS: Performed by: NURSE PRACTITIONER

## 2021-05-10 PROCEDURE — 63700000 HC SELF-ADMINISTRABLE DRUG: Performed by: NURSE PRACTITIONER

## 2021-05-10 PROCEDURE — 25000000 HC PHARMACY GENERAL: Performed by: NURSE PRACTITIONER

## 2021-05-10 PROCEDURE — 36415 COLL VENOUS BLD VENIPUNCTURE: CPT | Performed by: NURSE PRACTITIONER

## 2021-05-10 PROCEDURE — 25000000 HC PHARMACY GENERAL: Performed by: RADIOLOGY

## 2021-05-10 PROCEDURE — 63700000 HC SELF-ADMINISTRABLE DRUG: Performed by: HOSPITALIST

## 2021-05-10 PROCEDURE — 80076 HEPATIC FUNCTION PANEL: CPT | Performed by: HOSPITALIST

## 2021-05-10 PROCEDURE — 80202 ASSAY OF VANCOMYCIN: CPT | Performed by: NURSE PRACTITIONER

## 2021-05-10 PROCEDURE — 80048 BASIC METABOLIC PNL TOTAL CA: CPT | Performed by: HOSPITALIST

## 2021-05-10 PROCEDURE — 76937 US GUIDE VASCULAR ACCESS: CPT

## 2021-05-10 PROCEDURE — 36573 INSJ PICC RS&I 5 YR+: CPT

## 2021-05-10 PROCEDURE — 99232 SBSQ HOSP IP/OBS MODERATE 35: CPT | Performed by: HOSPITALIST

## 2021-05-10 PROCEDURE — C1751 CATH, INF, PER/CENT/MIDLINE: HCPCS

## 2021-05-10 RX ORDER — BISACODYL 10 MG/1
10 SUPPOSITORY RECTAL DAILY PRN
COMMUNITY

## 2021-05-10 RX ORDER — SODIUM CHLORIDE 0.9 % (FLUSH) 0.9 %
10 SYRINGE (ML) INJECTION AS NEEDED
Status: DISCONTINUED | OUTPATIENT
Start: 2021-05-10 | End: 2021-05-10 | Stop reason: HOSPADM

## 2021-05-10 RX ORDER — CALCIUM CARBONATE 200(500)MG
1 TABLET,CHEWABLE ORAL EVERY 6 HOURS PRN
COMMUNITY

## 2021-05-10 RX ORDER — METOPROLOL SUCCINATE 25 MG/1
25 TABLET, EXTENDED RELEASE ORAL DAILY
COMMUNITY

## 2021-05-10 RX ORDER — OXYCODONE HYDROCHLORIDE 5 MG/1
5 TABLET ORAL EVERY 4 HOURS PRN
Qty: 10 TABLET | Refills: 0 | Status: SHIPPED | OUTPATIENT
Start: 2021-05-10 | End: 2021-05-15

## 2021-05-10 RX ORDER — LIDOCAINE HYDROCHLORIDE 10 MG/ML
INJECTION, SOLUTION INFILTRATION; PERINEURAL
Status: COMPLETED | OUTPATIENT
Start: 2021-05-10 | End: 2021-05-10

## 2021-05-10 RX ORDER — AMOXICILLIN 250 MG
1 CAPSULE ORAL 2 TIMES DAILY
Qty: 179 TABLET | Refills: 0 | COMMUNITY
Start: 2021-05-10 | End: 2021-08-08

## 2021-05-10 RX ORDER — LORAZEPAM 1 MG/1
1 TABLET ORAL EVERY 6 HOURS PRN
Qty: 10 TABLET | Refills: 0 | Status: SHIPPED | OUTPATIENT
Start: 2021-05-10 | End: 2021-05-15

## 2021-05-10 RX ORDER — BACLOFEN 10 MG/1
10 TABLET ORAL 3 TIMES DAILY
COMMUNITY

## 2021-05-10 RX ORDER — NAPROXEN 250 MG/1
250 TABLET ORAL 2 TIMES DAILY WITH MEALS
COMMUNITY

## 2021-05-10 RX ORDER — PANTOPRAZOLE SODIUM 20 MG/1
20 TABLET, DELAYED RELEASE ORAL DAILY
COMMUNITY

## 2021-05-10 RX ORDER — GLYCERIN
LIQUID (ML) TOPICAL 2 TIMES DAILY
COMMUNITY

## 2021-05-10 RX ORDER — ATORVASTATIN CALCIUM 40 MG/1
40 TABLET, FILM COATED ORAL NIGHTLY
COMMUNITY

## 2021-05-10 RX ORDER — FUROSEMIDE 20 MG/1
20 TABLET ORAL DAILY
COMMUNITY

## 2021-05-10 RX ORDER — VANCOMYCIN HYDROCHLORIDE
1
Start: 2021-05-10

## 2021-05-10 RX ORDER — TALC
3 POWDER (GRAM) TOPICAL NIGHTLY
COMMUNITY

## 2021-05-10 RX ORDER — POLYETHYLENE GLYCOL 3350 17 G/17G
17 POWDER, FOR SOLUTION ORAL DAILY
Qty: 89 PACKET | Refills: 0 | COMMUNITY
Start: 2021-05-11 | End: 2021-08-08

## 2021-05-10 RX ORDER — VANCOMYCIN HYDROCHLORIDE
1.25
Start: 2021-05-10 | End: 2021-05-10

## 2021-05-10 RX ORDER — POTASSIUM CHLORIDE 750 MG/1
40 TABLET, FILM COATED, EXTENDED RELEASE ORAL ONCE
Status: COMPLETED | OUTPATIENT
Start: 2021-05-10 | End: 2021-05-10

## 2021-05-10 RX ORDER — SODIUM CHLORIDE 0.9 % (FLUSH) 0.9 %
10 SYRINGE (ML) INJECTION
Status: DISCONTINUED | OUTPATIENT
Start: 2021-05-10 | End: 2021-05-10 | Stop reason: HOSPADM

## 2021-05-10 RX ORDER — ACETAMINOPHEN 325 MG/1
650 TABLET ORAL EVERY 8 HOURS PRN
COMMUNITY

## 2021-05-10 RX ORDER — LIDOCAINE 560 MG/1
1 PATCH PERCUTANEOUS; TOPICAL; TRANSDERMAL DAILY
COMMUNITY

## 2021-05-10 RX ADMIN — CETIRIZINE HYDROCHLORIDE 10 MG: 10 TABLET, FILM COATED ORAL at 10:08

## 2021-05-10 RX ADMIN — Medication 1000 UNITS: at 10:08

## 2021-05-10 RX ADMIN — PANTOPRAZOLE SODIUM 20 MG: 20 TABLET, DELAYED RELEASE ORAL at 10:10

## 2021-05-10 RX ADMIN — METOPROLOL SUCCINATE 25 MG: 25 TABLET, EXTENDED RELEASE ORAL at 10:11

## 2021-05-10 RX ADMIN — CLOPIDOGREL BISULFATE 75 MG: 75 TABLET ORAL at 10:10

## 2021-05-10 RX ADMIN — BACLOFEN 10 MG: 10 TABLET ORAL at 17:03

## 2021-05-10 RX ADMIN — LIDOCAINE HYDROCHLORIDE 10 ML: 10 INJECTION, SOLUTION INFILTRATION; PERINEURAL at 15:54

## 2021-05-10 RX ADMIN — IOHEXOL 10 ML: 300 INJECTION, SOLUTION INTRAVENOUS at 16:34

## 2021-05-10 RX ADMIN — AMLODIPINE BESYLATE 5 MG: 5 TABLET ORAL at 10:11

## 2021-05-10 RX ADMIN — POTASSIUM CHLORIDE 40 MEQ: 750 TABLET, EXTENDED RELEASE ORAL at 10:04

## 2021-05-10 RX ADMIN — ASPIRIN 81 MG: 81 TABLET, DELAYED RELEASE ORAL at 10:08

## 2021-05-10 RX ADMIN — VANCOMYCIN HYDROCHLORIDE 1.25 G: 500 INJECTION, POWDER, LYOPHILIZED, FOR SOLUTION INTRAVENOUS at 02:10

## 2021-05-10 RX ADMIN — GABAPENTIN 300 MG: 300 CAPSULE ORAL at 10:10

## 2021-05-10 RX ADMIN — OXYCODONE HYDROCHLORIDE 5 MG: 5 TABLET ORAL at 17:58

## 2021-05-10 RX ADMIN — BACLOFEN 10 MG: 10 TABLET ORAL at 10:10

## 2021-05-10 NOTE — POST-PROCEDURE NOTE
Interventional Radiology Brief Postprocedure Note    Tracy Fernandez     Attending: Linwood    Assistant:      Diagnosis: Need for long-term IV antibiotics    Description of procedure: Left arm PICC placement    Contrast: 15 cc, Omni 300     Anesthesia:  Local    Medications:       Complications: None      Estimated Blood Loss: Estimated Blood Loss: 0-10 ml    Anticoagulation:      Specimens:      Findings: Successful left brachial DL 48 cm PICC placement with tip at cavoatrial junction, which is ready for use. Venogram demonstrates patent axillary and subclavian v.    5/10/2021 4:22 PM

## 2021-05-10 NOTE — PROGRESS NOTES
Major Events:  PICC L     Subjective:  No new complaints    Temp:  [36.8 °C (98.2 °F)-37.3 °C (99.1 °F)] 37.3 °C (99.1 °F)  Heart Rate:  [64-66] 65  Resp:  [16-18] 18  BP: (116-186)/(64-81) 184/81      SpO2 Readings from Last 3 Encounters:   05/10/21 96%   04/02/18 93%     Anti-infectives (From admission, onward)    Start     Dose/Rate Route Frequency Ordered Stop    05/10/21 1430  vancomycin 1 g/250 mL IVPB in NSS      1,000 mg  166.7 mL/hr over 90 Minutes intravenous Every 12 hours interval 05/10/21 1313          Physical Exam:  Skin: No rash  Sclera: Anicteric  Lungs: clr x bases  CV: S1, S2 nl, no embolic changes  Abd: Soft, nt  Extr: No jt eff, no edema  Neuro: Alert, lucid    Lab Results   Component Value Date    WBC 8.06 05/10/2021    HGB 9.4 (L) 05/10/2021    HCT 29.8 (L) 05/10/2021    MCV 83.7 05/10/2021     (H) 05/10/2021     Lab Results   Component Value Date    GLUCOSE 124 (H) 05/10/2021    CALCIUM 7.7 (L) 05/10/2021     05/10/2021    K 3.3 (L) 05/10/2021    CO2 25 05/10/2021     05/10/2021    BUN 11 05/10/2021    CREATININE 0.5 (L) 05/10/2021     Lab Results   Component Value Date    ALBUMIN 2.4 (L) 05/10/2021    BILITOT 0.7 05/10/2021    ALKPHOS 322 (H) 05/10/2021    BILIDIR 0.1 05/10/2021    AST 19 05/10/2021    ALT 38 05/10/2021    PROTEIN 5.4 (L) 05/10/2021     Microbiology Results     Procedure Component Value Units Date/Time    Anaerobic Culture / Smear (includes Aerobic Culture) Elbow, Right [[id number]] Collected: 05/07/21 1055    Specimen: Tissue from Elbow, Right Updated: 05/07/21 170     Gram Stain Result 2+ WBC      No organisms seen    Body Fluid Culture/Smear Elbow, Right [[id number]] Collected: 05/07/21 1050    Specimen: Synovial Fluid from Elbow, Right Updated: 05/07/21 1710     Gram Stain Result 4+ WBC      No organisms seen    Blood Culture Blood, Venous [[id number]] Collected: 05/06/21 0051    Specimen: Blood, Venous Updated: 05/07/21 0500     Culture No growth at  18-24 hours    Blood Culture Blood, Venous [[id number]] Collected: 05/06/21 0051    Specimen: Blood, Venous Updated: 05/07/21 0500     Culture No growth at 18-24 hours    SARS-CoV-2 (COVID-19), PCR Nasopharynx [[id number]]  (Normal) Collected: 05/05/21 1835    Specimen: Nasopharyngeal Swab from Nasopharynx Updated: 05/05/21 1936    Narrative:      The following orders were created for panel order SARS-CoV-2 (COVID-19), PCR Nasopharynx.  Procedure                               Abnormality         Status                     ---------                               -----------         ------                     SARS-CoV-2 (COVID-19), P...[[id number]]  Normal              Final result                 Please view results for these tests on the individual orders.    SARS-CoV-2 (COVID-19), PCR Nasopharynx [[id number]]  (Normal) Collected: 05/05/21 1835    Specimen: Nasopharyngeal Swab from Nasopharynx Updated: 05/05/21 1936     SARS-CoV-2 (COVID-19) Negative    Anaerobic Culture / Smear (includes Aerobic Culture) Elbow, Right [[id number]] Collected: 05/05/21 1758    Specimen: Abscess Fluid from Elbow, Right Updated: 05/07/21 1523     Culture No growth at 48 hours     Gram Stain Result 4+ WBC      No organisms seen        Impression    infected elbow prothesis  S epi on cx  Cont vanco  Plan 6-8wk rx    Will f/u in office in 4-5wk  Needs weekly CBC, CMP, vanco trough, ESR, CRP  Target trough 15-20    YS MD Eleni  Pager [phone]

## 2021-05-10 NOTE — PROGRESS NOTES
OR culture growing staph epi  Contacted Dr. Knowles regarding sensitivities  Plan per Dr. Knowles is to continue vanco x 6 weeks  PICC ordered and IV team paged  Plan for dc to [facility] today if PICC can be placed and arrangements made  Updated patient  Discussed with Dr. Almanza    Addendum:  1610:  IV team unable to place PICC at bedside.  IR consult placed and contacted IR who will do PICC today.  Social work provided with update.  Received message from Dr. Almanza that patient's son, Moe, had questions about dispo.  Call placed to Moe, all questions answered.

## 2021-05-10 NOTE — PROGRESS NOTES
Ortho Daily Progress Note    Subjective     Interval History: Patient seen and examined at bedside resting comfortably.  Pain controlled.  Denies tingling, numbness or weakness in RUE.  Denies f/c/n/v/cp/sob.       Objective     Vital signs in last 24 hours:  Vitals:    05/10/21 0500   BP: 116/64   Pulse: 64   Resp: 16   Temp: 36.8 °C (98.2 °F)   SpO2: 94%         Imaging:  X-ray right elbow: demonstrates resection arthroplasty with retained cerclage wires of humerus and screw along proximal ulna with significant bone loss for radius and ulna.         Physical Exam:  Gen: awake and alert, no acute distress  HEENT: normocephalic, atraumatic  Cards: regular rate, bcr  Pulm: no increased work of breathing, no audible wheezing    RUE:   Splint in place, preveena with no output and suction intact, good seal.  Sensation intact distally rad ulnar median  Distal motor intact m/u/r/AIN/PIN  No pain with passive stretch of fingers and hand  +2 radial pulse  Cap refill < 2 secs    A/P:   75 y.o. female status post right JACOB 2009 with subsequent patient had several revision surgeries, most recently in antibiotic spacer, now presenting with new onset atraumatic pain and swelling of the right elbow.  Now s/p r elbow I&D with resection arthroplasty on 5/7 with Dr. Almanza.    Plan:  IO tissue CX 5/7 + for staph epidermidis-on vanco await ID recs for on-going abx therapy  NWB RUE in splint, ctm preveena   DVT/PE ppx: ASA 81mg QD, flowtrons, home plavix restarted today per cards recs  IO cx NGTD & R elbow aspirate from 5/5 NGTD last updated 5/8  PT/OT, baseline left hemiplegia, now NWB RUE, PT/OT recommending DC to SNF  Multimodal pain control  Appreciate med/ID recs      Garland Poon DO   Orthopedic Surgery PGY4  Pager: [phone]

## 2021-05-10 NOTE — PLAN OF CARE
Plan of Care Review  Plan of Care Reviewed With: patient  Progress: improving  Outcome Summary: Pt resting in bed, reports mild pain. Discussed PRN and scheduled medications. Pt verbalized understanding. Postion changed, pillow support provided, purwick catheter in place. Bed alarm on, adpative call bell within reach.

## 2021-05-10 NOTE — PROGRESS NOTES
Hospital Medicine Service -  Daily Progress Note       SUBJECTIVE   Slept well last night  The pain is controlled  Afebrile     OBJECTIVE      Vital signs in last 24 hours:  Temp:  [36.8 °C (98.2 °F)-37.2 °C (99 °F)] 36.8 °C (98.2 °F)  Heart Rate:  [64-66] 64  Resp:  [16-18] 18  BP: (116-186)/(64-81) 186/81    Intake/Output Summary (Last 24 hours) at 5/10/2021 1608  Last data filed at 5/10/2021 1000  Gross per 24 hour   Intake 570 ml   Output 2400 ml   Net -1830 ml       PHYSICAL EXAMINATION      Physical Exam  Vitals and nursing note reviewed.   Constitutional:       Appearance: Normal appearance. She is well-developed. She is obese.   Eyes:      General: No scleral icterus.  Neck:      Vascular: No JVD.   Cardiovascular:      Rate and Rhythm: Normal rate and regular rhythm.      Heart sounds: Normal heart sounds.   Pulmonary:      Effort: Pulmonary effort is normal.      Breath sounds: Normal breath sounds.   Abdominal:      General: Bowel sounds are normal.      Palpations: Abdomen is soft. There is no mass.      Tenderness: There is no abdominal tenderness.   Musculoskeletal:         General: No swelling.      Cervical back: Neck supple.   Neurological:      Mental Status: She is alert and oriented to person, place, and time.   Psychiatric:         Behavior: Behavior normal.        LABS / IMAGING / TELE      Labs  I have reviewed the patient's labs. 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  Excisional irrigation and debridement, right elbow (including skin, subcutaneous fat, fascia, muscle, and bone) 2. Removal of humeral component, right elbow May 7  OR culture: Staph epi  Orthopedic surgery is following    Infectious diseases following  On vancomycin  The plan is for 6 weeks of  antibiotics    Abnormal LFTs  Assessment & Plan  Likely secondary to infection  Improving    Anemia  Assessment & Plan  Likely anemia of chronic disease  Monitor    Hypokalemia  Assessment & Plan  Replete as needed    Gastroesophageal reflux disease without esophagitis  Assessment & Plan  Continue Protonix    Rheumatoid arthritis (CMS/HCC)  Assessment & Plan  Not on DMARD    Essential hypertension  Assessment & Plan  Continue metoprolol, amlodipine  Blood pressure is controlled    S/P AVR (aortic valve replacement)  Assessment & Plan  History of porcine aortic valve replacement in 2005 and TAVR in 2018.  Stable    Cardiology eval noted    History of CVA with residual deficit  Assessment & Plan  In 2017 and has left hemiplegia and is bedbound    Continue aspirin and Plavix  Continue baclofen and gabapentin         VTE Assessment: Padua    Code Status: DNR (A.N.D.)  Estimated discharge date: 5/10/2021     Becca Barros MD  5/10/2021

## 2021-05-10 NOTE — PROGRESS NOTES
5/10/2021  PRAVEEN informed that patient will be discharged later today after PICC line placed.     Patient resides at [facility] @ [phone]. SW contacted facility spoke to Mayra Chand unit manager made aware of discharge plans they can do IV Antibiotics did refer SW to contact Admissions.     PRAVEEN contacted Admissions @ [phone] LVM. SW to follow. Yoselyn Oliva MSW    ADDENDUM 3:46 PM  SW spoke to NP patient getting PICC line placed this afternoon. SW spoke to Admissions at [facility] who is aware of patient's return, did ask for PICC line information once placed to be faxed @ [phone]. PRAVEEN set up transport BLS w/ [facility] set for 6:30 PM , family and facility made aware. SW to follow. Yoselyn Oliva MSW    ADDENDUM 6:07 PM  SW faxed the discharge instructions with PICC information to [facility] @ [phone]. PRAVEEN updated RN. Patient aware of discharge spoke to son. RN will get out of hospital DNR form signed by Doctor. No other needs. Yoselyn BHARDWAJ

## 2021-05-10 NOTE — PROGRESS NOTES
Vancomycin Dosing by Pharmacy Consult Follow up    Tracy Fernandez is a 75 y.o. female who has been consulted for vancomycin dosing for bone/joint infection.    Reviewed relevant clinical data including weight, renal function, previous vancomycin doses, and vancomycin levels  Creatinine   Date/Time Value Ref Range Status   05/10/2021 0210 0.5 (L) 0.6 - 1.1 mg/dL Final   05/08/2021 1034 0.5 (L) 0.6 - 1.1 mg/dL Final   05/06/2021 1048 0.4 (L) 0.6 - 1.1 mg/dL Final     Vancomycin Tr   Date/Time Value Ref Range Status   05/10/2021 0210 21.7 (HH) 10.0 - 15.0 ug/mL Final     Comment:     For all patients with complicated infections with methicillin resistant Staphylococcus aureus (MRSA), e.g. endocarditis, osteomyelitis, meningitis, pneumonia; the vancomycin trough therpeutic range is 15-20 ug/mL.          Vancomycin Administrations (last 96 hours)       Date/Time Action Medication Dose Rate    05/10/21 0210 New Bag    vancomycin 1.25 gram/250 mL IVPB in NSS 1.25 g 166.7 mL/hr    05/09/21 1517 New Bag    vancomycin 1.25 gram/250 mL IVPB in NSS 1.25 g 166.7 mL/hr    05/09/21 0238 New Bag    vancomycin 1.25 gram/250 mL IVPB in NSS 1.25 g 166.7 mL/hr    05/08/21 1422 New Bag    vancomycin 1.25 gram/250 mL IVPB in NSS 1.25 g 166.7 mL/hr    05/08/21 0256 New Bag    vancomycin 1.25 gram/250 mL IVPB in NSS 1.25 g 166.7 mL/hr    05/07/21 1441 New Bag    vancomycin 1.25 gram/250 mL IVPB in NSS 1.25 g 166.7 mL/hr    05/07/21 0131 New Bag    vancomycin 1.25 gram/250 mL IVPB in NSS 1.25 g 166.7 mL/hr              Assessment/Plan  The patient is ordered vancomycin dosing by pharmacy.      The patient’s renal function; is stable    Vancomycin trough level 21.7 on maintenance dose of 1250 mg IV Q 12 H with a goal trough 15-20 ug/mL.      Will adjust dose to vancomycin 1000 mg IV Q 12 H .      Next trough:  05/12/21 @ 2:00 am    Pharmacy will continue to follow the patient’s vancomycin dosing daily during this course of  therapy.      Please call vancomycin levels to the pharmacy.  Milena Bird, IsabelleD

## 2021-05-10 NOTE — DISCHARGE INSTRUCTIONS
*CHECK WEEKLY CBC WITH DIFF, CMP, ESR, CRP, AND VANCO TROUGH WHILE ON IV VANCO X 6 WEEKS.  FAX RESULTS TO DR. NILS CHO [phone].    [facility]  Shoulder and Elbow Service  Discharge instructions after Elbow Surgery    Diet:  Resume regular diet as tolerated.    Medication:  Take pain medicine as prescribed.    Over the counter Tylenol 1,000mg every 6 hours may be taken if necessary for two days after surgery.  Please do not take tylenol if you have a history of liver problems.  While taking pain medicine, use a stool softener such as Metamucil, prune juice, or colace as constipation is common.  Take Indomethicin if prescribed.  If you have a history of stomach ulcers, please notify Dr. Almanza.  Call the doctor if you experience any abdominal pain.  Please resume regular medication regimen after surgery.    Take one 81mg aspirin once per day along with plavix for blood clot prevention.    Activity:  -Minimize activity the day of surgery.  -Ice may initially be difficult to apply because of thick dressings.  Apply ice to the elbow 3 times per day for 20 minutes until your elbow is feeling comfortable again.  -Open and close your hand 10 times every hour that you are awake.  -Keep a pillow under your elbow while lying down or sleeping.  Sleeping in an upright position (recliner) may be more comfortable.  -DO NOT use heat  -DO NOT ACTIVELY (on your own) use your arm until you are instructed to do so.  -DO NOT lift anything with your operative hand.  -DO NOT USE exercise equipment unless otherwise instructed.  -Keep arm elevated above your heart.  -Do NOT drive a car until instructed by your physician's office.  -You can use the hand to hold things (no heavier than a coffee cup), write, and type.    Sling and dressing care:  -Maintain the operative arm in the dressings, until you are seen back in the physician's office or unless specifically instructed otherwise by Dr. Almanza  -Keep the dressing dry.  -You  have a negative pressure dressing covering your incision that is hooked up to a battery pack.  If this machine begins to beep, call Dr. Almanza's office so he can remove the bandage.    -You can expect some light bloody wound seepage through the bandage.  DO NOT BE ALARMED.  This is normal.  -If the dressing gets soaked with wound seepage, call your physician's office at [phone].    Showering:  You may take a shower 2-3 days after surgery unless told otherwise.  Use a plastic bag or cast cover to protect the dressing/cast from getting wet.    Notify your physician if you experience any of the following:  -Any fever over 101.5 degrees  -Excessive bloody seepage  -Numbness in the arm  -Please call the on call service at [phone]    -You should return to the office within 1 week after surgery for a follow-up appointment to have dressing removed.

## 2021-05-11 LAB
BACTERIA BLD CULT: NORMAL
BACTERIA BLD CULT: NORMAL
GRAM STN SPEC: ABNORMAL
GRAM STN SPEC: ABNORMAL
MICROORGANISM SPEC CULT: ABNORMAL
MICROORGANISM SPEC CULT: ABNORMAL

## 2021-05-12 LAB
GRAM STN SPEC: ABNORMAL
GRAM STN SPEC: ABNORMAL
MICROORGANISM SPEC CULT: ABNORMAL

## 2021-05-12 NOTE — DISCHARGE SUMMARY
Ortho Discharge Summary    Admitting Provider: Tyler Almanza MD  Discharge Provider: No att. 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  Dose: 5 mg     polyethylene glycol 17 gram packet  Commonly known as: MIRALAX  Take 17 g by mouth daily for 89 doses.  Dose: 17 g     sennosides-docusate sodium 8.6-50 mg  Commonly known as: SENOKOT-S  Take 1 tablet by mouth 2 (two) times a day for 179 doses.  Dose: 1 tablet     vancomycin 1.25 gram/250 mL  Infuse 200 mL (1 g total) into a venous catheter every 12 (twelve) hours. For 6 weeks from 5/7/2021. Check weeklly CBC with diff, CMP, ESR, CRP, and vanco trough.  Fax results to Dr. Shravan Knowles at [phone]  Dose: 1 g        CHANGE how you take these medications    LORazepam 1 mg tablet  Commonly known as: ATIVAN  Take 1 tablet (1 mg total) by mouth every 6 (six) hours as needed for anxiety for up to 5 days.  Dose: 1 mg  What changed:   · when to take this  · reasons to take this        CONTINUE taking these medications    acetaminophen 325 mg tablet  Commonly known as: TYLENOL  Take 650 mg by mouth every 8 (eight) hours as needed for mild pain.  Dose: 650 mg     amLODIPine 5 mg tablet  Commonly known as: NORVASC  Take 5 mg by mouth daily.  Dose: 5 mg     arm brace misc     aspirin 81 mg enteric coated tablet  Take 81 mg by mouth daily.  Dose: 81 mg     atorvastatin 40 mg tablet  Commonly  known as: LIPITOR  Take 40 mg by mouth nightly.  Dose: 40 mg     baclofen 10 mg tablet  Commonly known as: LIORESAL  Take 10 mg by mouth 3 (three) times a day.  Dose: 10 mg     bisacodyL 10 mg suppository  Commonly known as: DULCOLAX  Insert 10 mg into the rectum daily as needed for constipation.  Dose: 10 mg     calcium carbonate 200 mg calcium (500 mg) chewable tablet  Commonly known as: TUMS  Take 1 tablet by mouth every 6 (six) hours as needed for indigestion or heartburn.  Dose: 1 tablet     cetirizine 10 mg tablet  Commonly known as: ZyrTEC  Take 10 mg by mouth daily.  Dose: 10 mg     cholecalciferol 1,250 mcg (50,000 unit) capsule  Commonly known as: VITAMIN D3  Take 1,000 Units by mouth daily.  Dose: 1,000 Units     clopidogreL 75 mg tablet  Commonly known as: PLAVIX  Take 75 mg by mouth daily.  Dose: 75 mg     furosemide 20 mg tablet  Commonly known as: LASIX  Take 20 mg by mouth daily.  Dose: 20 mg     gabapentin 300 mg capsule  Commonly known as: NEURONTIN  Take 300 mg by mouth 2 (two) times a day.  Dose: 300 mg     glycerin liquid  Apply topically 2 (two) times a day. To both eyes     lidocaine 4 % adhesive patch,medicated topical patch  Commonly known as: ASPERCREME  Apply 1 patch topically daily. Left hip  Dose: 1 patch     melatonin 3 mg tablet  Take 3 mg by mouth nightly.  Dose: 3 mg     metoprolol succinate XL 25 mg 24 hr tablet  Commonly known as: TOPROL-XL  Take 25 mg by mouth daily.  Dose: 25 mg     naproxen 250 mg tablet  Commonly known as: NAPROSYN  Take 250 mg by mouth 2 (two) times a day with meals.  Dose: 250 mg     pantoprazole 20 mg EC tablet  Commonly known as: PROTONIX  Take 20 mg by mouth daily.  Dose: 20 mg     sodium chloride 0.65 % nasal spray  Commonly known as: OCEAN  Administer 1 spray into each nostril 2 (two) times a day.  Dose: 1 spray            Active Issues Requiring Follow-up  Issue: Prosthetic joint infection  Responsible Individual: Dr. Almanza  What is Needed: Follow up  with Kayla Almanza and Eleni (ID)  Follow-up Appointments Arranged: No    Outpatient Follow-Up  Encounter Information    This patient does not currently have any appointments scheduled.           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

## 2021-06-04 LAB — FUNGUS SPEC CULT: NORMAL

## 2021-06-19 LAB — MYCOBACTERIUM SPEC CULT: NORMAL

## 2023-05-18 ENCOUNTER — APPOINTMENT (RX ONLY)
Dept: URBAN - METROPOLITAN AREA CLINIC 374 | Facility: CLINIC | Age: 78
Setting detail: DERMATOLOGY
End: 2023-05-18

## 2023-05-18 DIAGNOSIS — L82.0 INFLAMED SEBORRHEIC KERATOSIS: ICD-10-CM

## 2023-05-18 PROBLEM — C44.729 SQUAMOUS CELL CARCINOMA OF SKIN OF LEFT LOWER LIMB, INCLUDING HIP: Status: ACTIVE | Noted: 2023-05-18

## 2023-05-18 PROCEDURE — 11102 TANGNTL BX SKIN SINGLE LES: CPT | Mod: 59

## 2023-05-18 PROCEDURE — ? BIOPSY BY SHAVE METHOD

## 2023-05-18 PROCEDURE — ? PRESCRIPTION

## 2023-05-18 PROCEDURE — ? PRESCRIPTION MEDICATION MANAGEMENT

## 2023-05-18 PROCEDURE — ? BENIGN DESTRUCTION

## 2023-05-18 PROCEDURE — 17110 DESTRUCTION B9 LES UP TO 14: CPT

## 2023-05-18 PROCEDURE — ? COUNSELING

## 2023-05-18 PROCEDURE — ? PHOTO-DOCUMENTATION

## 2023-05-18 RX ORDER — SILVER SULFADIAZINE 10 MG/G
CREAM TOPICAL QDAY
Qty: 25 | Refills: 1 | Status: ERX | COMMUNITY
Start: 2023-05-18

## 2023-05-18 RX ADMIN — SILVER SULFADIAZINE 1: 10 CREAM TOPICAL at 00:00

## 2023-05-18 ASSESSMENT — LOCATION DETAILED DESCRIPTION DERM
LOCATION DETAILED: RIGHT LATERAL BREAST 7-8:00 REGION
LOCATION DETAILED: RIGHT LATERAL ABDOMEN
LOCATION DETAILED: LEFT LATERAL ABDOMEN

## 2023-05-18 ASSESSMENT — LOCATION SIMPLE DESCRIPTION DERM
LOCATION SIMPLE: ABDOMEN
LOCATION SIMPLE: RIGHT BREAST

## 2023-05-18 ASSESSMENT — LOCATION ZONE DERM: LOCATION ZONE: TRUNK

## 2023-05-18 NOTE — PROCEDURE: PRESCRIPTION MEDICATION MANAGEMENT
Detail Level: Zone
Render In Strict Bullet Format?: No
Initiate Treatment: Silvadene 1 % topical cream: Apply a thin layer to AA of the leg qday until healed

## 2023-05-18 NOTE — PROCEDURE: BENIGN DESTRUCTION
Add 52 Modifier (Optional): no
Treatment Number (Will Not Render If 0): 1
Anesthesia Volume In Cc: 0.3
Detail Level: Detailed
Medical Necessity Clause: This procedure was medically necessary because the lesions that were treated were:
Post-Care Instructions: I reviewed with the patient in detail post-care instructions. Patient is to wear sunprotection, and avoid picking at any of the treated lesions. Pt may apply Vaseline to crusted or scabbing areas.
Medical Necessity Information: It is in your best interest to select a reason for this procedure from the list below. All of these items fulfill various CMS LCD requirements except the new and changing color options.
Consent: The patient's consent was obtained including but not limited to risks of crusting, scabbing, blistering, scarring, darker or lighter pigmentary change, recurrence, incomplete removal and infection.

## 2023-05-18 NOTE — PROCEDURE: BIOPSY BY SHAVE METHOD
Detail Level: Detailed
Depth Of Biopsy: dermis
Was A Bandage Applied: Yes
Size Of Lesion In Cm: 0
Biopsy Type: H and E
Biopsy Method: Dermablade
Anesthesia Type: 1% lidocaine with epinephrine
Anesthesia Volume In Cc (Will Not Render If 0): 0.3
Hemostasis: Electrocautery and Aluminum Chloride
Wound Care: Petrolatum
Dressing: bandage
Destruction After The Procedure: No
Type Of Destruction Used: Curettage
Curettage Text: The wound bed was treated with curettage after the biopsy was performed.
Cryotherapy Text: The wound bed was treated with cryotherapy after the biopsy was performed.
Electrodesiccation Text: The wound bed was treated with electrodesiccation after the biopsy was performed.
Electrodesiccation And Curettage Text: The wound bed was treated with electrodesiccation and curettage after the biopsy was performed.
Silver Nitrate Text: The wound bed was treated with silver nitrate after the biopsy was performed.
Lab: 6
Path Notes (To The Dermatopathologist): Please check margins.
Consent: Written consent was obtained and risks were reviewed including but not limited to scarring, infection, bleeding, scabbing, incomplete removal, nerve damage and allergy to anesthesia.
Post-Care Instructions: I reviewed with the patient in detail post-care instructions. Patient is to keep the biopsy site dry overnight, and then apply bacitracin twice daily until healed. Patient may apply hydrogen peroxide soaks to remove any crusting.
Notification Instructions: Patient will be notified of biopsy results. However, patient instructed to call the office if not contacted within 2 weeks.
Billing Type: Third-Party Bill
Information: Selecting Yes will display possible errors in your note based on the variables you have selected. This validation is only offered as a suggestion for you. PLEASE NOTE THAT THE VALIDATION TEXT WILL BE REMOVED WHEN YOU FINALIZE YOUR NOTE. IF YOU WANT TO FAX A PRELIMINARY NOTE YOU WILL NEED TO TOGGLE THIS TO 'NO' IF YOU DO NOT WANT IT IN YOUR FAXED NOTE.

## 2023-06-21 ENCOUNTER — APPOINTMENT (RX ONLY)
Dept: URBAN - METROPOLITAN AREA CLINIC 374 | Facility: CLINIC | Age: 78
Setting detail: DERMATOLOGY
End: 2023-06-21

## 2023-06-21 DIAGNOSIS — L82.0 INFLAMED SEBORRHEIC KERATOSIS: ICD-10-CM | Status: IMPROVED

## 2023-06-21 DIAGNOSIS — L57.0 ACTINIC KERATOSIS: ICD-10-CM

## 2023-06-21 DIAGNOSIS — L85.8 OTHER SPECIFIED EPIDERMAL THICKENING: ICD-10-CM

## 2023-06-21 PROCEDURE — 17110 DESTRUCTION B9 LES UP TO 14: CPT

## 2023-06-21 PROCEDURE — ? PREMALIGNANT DESTRUCTION

## 2023-06-21 PROCEDURE — ? PRESCRIPTION MEDICATION MANAGEMENT

## 2023-06-21 PROCEDURE — 17000 DESTRUCT PREMALG LESION: CPT | Mod: 59

## 2023-06-21 PROCEDURE — ? PRESCRIPTION

## 2023-06-21 PROCEDURE — ? COUNSELING

## 2023-06-21 PROCEDURE — ? BENIGN DESTRUCTION

## 2023-06-21 PROCEDURE — 99212 OFFICE O/P EST SF 10 MIN: CPT | Mod: 25

## 2023-06-21 RX ORDER — AMMONIUM LACTATE 12 G/100G
1 LOTION TOPICAL QDAY
Qty: 400 | Refills: 3 | Status: ERX | COMMUNITY
Start: 2023-06-21

## 2023-06-21 RX ADMIN — AMMONIUM LACTATE 1: 12 LOTION TOPICAL at 00:00

## 2023-06-21 ASSESSMENT — LOCATION SIMPLE DESCRIPTION DERM
LOCATION SIMPLE: ABDOMEN
LOCATION SIMPLE: RIGHT PRETIBIAL REGION
LOCATION SIMPLE: LEFT PRETIBIAL REGION

## 2023-06-21 ASSESSMENT — LOCATION DETAILED DESCRIPTION DERM
LOCATION DETAILED: LEFT DISTAL PRETIBIAL REGION
LOCATION DETAILED: LEFT PROXIMAL PRETIBIAL REGION
LOCATION DETAILED: RIGHT LATERAL ABDOMEN
LOCATION DETAILED: RIGHT DISTAL PRETIBIAL REGION
LOCATION DETAILED: LEFT LATERAL ABDOMEN

## 2023-06-21 ASSESSMENT — LOCATION ZONE DERM
LOCATION ZONE: TRUNK
LOCATION ZONE: LEG

## 2023-06-21 NOTE — PROCEDURE: PREMALIGNANT DESTRUCTION
Render Note In Bullet Format When Appropriate: No
Anesthesia Volume In Cc: 0
Consent: The patient's consent was obtained including but not limited to risks of crusting, scabbing, blistering, scarring, darker or lighter pigmentary change, recurrence, incomplete removal and infection.
Post-Care Instructions: I reviewed with the patient in detail post-care instructions. Patient is to wear sunprotection, and avoid picking at any of the treated lesions. Pt may apply Vaseline to crusted or scabbing areas.
Method: liquid nitrogen
Detail Level: Zone

## 2023-06-21 NOTE — PROCEDURE: PRESCRIPTION MEDICATION MANAGEMENT
Detail Level: Zone
Render In Strict Bullet Format?: No
Discontinue Regimen: Silvadene 1 % topical cream: Apply a thin layer to AA of the leg qday until healed (facility didn’t use)
Initiate Treatment: ammonium lactate 12% lotion: Apply a thin layer QDAY to the legs after 8 pm shower

## 2023-06-21 NOTE — PROCEDURE: BENIGN DESTRUCTION
Consent: The patient's consent was obtained including but not limited to risks of crusting, scabbing, blistering, scarring, darker or lighter pigmentary change, recurrence, incomplete removal and infection.
Medical Necessity Information: It is in your best interest to select a reason for this procedure from the list below. All of these items fulfill various CMS LCD requirements except the new and changing color options.
Include Z78.9 (Other Specified Conditions Influencing Health Status) As An Associated Diagnosis?: No
Medical Necessity Clause: This procedure was medically necessary because the lesions that were treated were:
Anesthesia Volume In Cc: 0
Detail Level: Detailed
Treatment Number (Will Not Render If 0): 2
Post-Care Instructions: I reviewed with the patient in detail post-care instructions. Patient is to wear sunprotection, and avoid picking at any of the treated lesions. Pt may apply Vaseline to crusted or scabbing areas.

## 2023-09-20 ENCOUNTER — RX ONLY (OUTPATIENT)
Age: 78
Setting detail: RX ONLY
End: 2023-09-20

## 2023-09-20 ENCOUNTER — APPOINTMENT (RX ONLY)
Dept: URBAN - METROPOLITAN AREA CLINIC 374 | Facility: CLINIC | Age: 78
Setting detail: DERMATOLOGY
End: 2023-09-20

## 2023-09-20 DIAGNOSIS — L85.8 OTHER SPECIFIED EPIDERMAL THICKENING: ICD-10-CM | Status: IMPROVED

## 2023-09-20 DIAGNOSIS — L57.0 ACTINIC KERATOSIS: ICD-10-CM | Status: RESOLVED

## 2023-09-20 DIAGNOSIS — L82.0 INFLAMED SEBORRHEIC KERATOSIS: ICD-10-CM

## 2023-09-20 PROCEDURE — 99213 OFFICE O/P EST LOW 20 MIN: CPT

## 2023-09-20 PROCEDURE — ? PRESCRIPTION MEDICATION MANAGEMENT

## 2023-09-20 RX ORDER — AMMONIUM LACTATE 12 G/100G
1 LOTION TOPICAL QDAY
Qty: 400 | Refills: 3 | Status: ERX

## 2023-09-20 ASSESSMENT — LOCATION SIMPLE DESCRIPTION DERM
LOCATION SIMPLE: ABDOMEN
LOCATION SIMPLE: LEFT PRETIBIAL REGION
LOCATION SIMPLE: RIGHT PRETIBIAL REGION

## 2023-09-20 ASSESSMENT — LOCATION ZONE DERM
LOCATION ZONE: TRUNK
LOCATION ZONE: LEG

## 2023-09-20 ASSESSMENT — LOCATION DETAILED DESCRIPTION DERM
LOCATION DETAILED: LEFT MEDIAL DISTAL PRETIBIAL REGION
LOCATION DETAILED: RIGHT DISTAL PRETIBIAL REGION
LOCATION DETAILED: RIGHT LATERAL ABDOMEN
LOCATION DETAILED: LEFT LATERAL ABDOMEN
LOCATION DETAILED: LEFT PROXIMAL PRETIBIAL REGION

## 2023-09-20 NOTE — PROCEDURE: PRESCRIPTION MEDICATION MANAGEMENT
Detail Level: Zone
Render In Strict Bullet Format?: No
Continue Regimen: ammonium lactate 12% lotion: Apply a thin layer QDAY to the legs after 8 pm shower

## 2023-12-11 NOTE — PROCEDURE: PHOTO-DOCUMENTATION
Detail Level: Zone
Photo Preface (Leave Blank If You Do Not Want): Photographs were obtained today
Quality 431: Preventive Care And Screening: Unhealthy Alcohol Use - Screening: Patient not identified as an unhealthy alcohol user when screened for unhealthy alcohol use using a systematic screening method
Detail Level: Detailed
Quality 111:Pneumonia Vaccination Status For Older Adults: Patient received any pneumococcal conjugate or polysaccharide vaccine on or after their 60th birthday and before the end of the measurement period
Quality 226: Preventive Care And Screening: Tobacco Use: Screening And Cessation Intervention: Patient screened for tobacco use and is an ex/non-smoker
Quality 110: Preventive Care And Screening: Influenza Immunization: Influenza Immunization Administered during Influenza season
Quality 130: Documentation Of Current Medications In The Medical Record: Current Medications Documented

## (undated) DEVICE — ***USE 143206***SYRINGE BULB

## (undated) DEVICE — PAD GROUND ELECTROSURGICAL W/CORD

## (undated) DEVICE — DRAPE-U-1015

## (undated) DEVICE — SOLN IRRIG .9%SOD 500ML

## (undated) DEVICE — ***USE 138715*** SUTURE VICRYL 0 J346H CT-1 36IN VIOLET

## (undated) DEVICE — SOLN IRRIG STER WATER 1500ML

## (undated) DEVICE — DRAPE STERI TOWEL PLASTIC 18X24

## (undated) DEVICE — TIP COAXIAL FEMORAL CANAL

## (undated) DEVICE — BURRS OVAL MEDIUM 4X8MM

## (undated) DEVICE — GAUZE XEROFORM 1X8 NON-STERILE PACKET

## (undated) DEVICE — VEST STERILE

## (undated) DEVICE — SUTURE MONOCRYL 2-0 Y266H

## (undated) DEVICE — PENEVAC1 NONSTICK SMOKE EVAC

## (undated) DEVICE — SUTURE COTTON TAPE X4323 1IN

## (undated) DEVICE — CORD BIPOLAR

## (undated) DEVICE — GOWN SURG STRL W/TWL XLG

## (undated) DEVICE — CONTAINER SPECIMEN 4OZ

## (undated) DEVICE — DRAPE POUCH IRRIGATION

## (undated) DEVICE — KIT PREVENA 25.4CM 7 DAY PUMP PEEL/PLACE

## (undated) DEVICE — DRAPE 3/4 REINFORCED

## (undated) DEVICE — ***USE 59070*** SUTURE VICRYL 0 J260H CT-1 27IN UNDYED

## (undated) DEVICE — GLOVE SZ 8 PROTEXIS PI

## (undated) DEVICE — SUCTION 18FR FRAZIER DISPOSABLE

## (undated) DEVICE — PACK RFID UPPER EXTREMITY

## (undated) DEVICE — GAUZE 4X4 16 PLY RFID DOUBLE XRAY

## (undated) DEVICE — MANIFOLD FOUR PORT NEPTUNE

## (undated) DEVICE — TIP SUCTION YANKAUER

## (undated) DEVICE — BLADE SCALPEL #10

## (undated) DEVICE — CANISTER INFOVAC 500ML W/GEL- 10 PACK

## (undated) DEVICE — ***USE 57000*** SUTURE PROLENE  0   8412H

## (undated) DEVICE — Device

## (undated) DEVICE — BLADE SAW SAGITTAL WIDE 14X25.5X.38

## (undated) DEVICE — KIT TOTAL HIP PREP - IM

## (undated) DEVICE — HOOD STERISHIELD

## (undated) DEVICE — NOZZLE FLEXIBLE THIN

## (undated) DEVICE — SPONGE LAP 18X18 SAFE-T RFID ENHANCED XRAY

## (undated) DEVICE — MAT ABSORBANT SUPERMAT 50 X 56

## (undated) DEVICE — BLADE SCALPEL #15

## (undated) DEVICE — HANDPIECE SUCTION INTERPULSE W/BONE CLEANING TIP

## (undated) DEVICE — APPLICATOR CHLORAPREP 26ML ORANGE TINT

## (undated) DEVICE — STAPLER SKIN

## (undated) DEVICE — TUBE SUCTION 1/4INX20FT STERILE

## (undated) DEVICE — DRAPE IOBAN

## (undated) DEVICE — CUFF TOURNIQUET REPROCESSED 18IN